# Patient Record
Sex: MALE | Race: BLACK OR AFRICAN AMERICAN | Employment: OTHER | URBAN - METROPOLITAN AREA
[De-identification: names, ages, dates, MRNs, and addresses within clinical notes are randomized per-mention and may not be internally consistent; named-entity substitution may affect disease eponyms.]

---

## 2018-04-04 ENCOUNTER — OFFICE VISIT (OUTPATIENT)
Dept: FAMILY MEDICINE CLINIC | Age: 58
End: 2018-04-04
Payer: COMMERCIAL

## 2018-04-04 VITALS
HEIGHT: 68 IN | HEART RATE: 64 BPM | DIASTOLIC BLOOD PRESSURE: 80 MMHG | TEMPERATURE: 98.4 F | BODY MASS INDEX: 28.49 KG/M2 | RESPIRATION RATE: 18 BRPM | WEIGHT: 188 LBS | OXYGEN SATURATION: 98 % | SYSTOLIC BLOOD PRESSURE: 136 MMHG

## 2018-04-04 DIAGNOSIS — I48.91 ATRIAL FIBRILLATION, UNSPECIFIED TYPE (HCC): ICD-10-CM

## 2018-04-04 DIAGNOSIS — E87.6 HYPOKALEMIA: ICD-10-CM

## 2018-04-04 DIAGNOSIS — I10 ESSENTIAL HYPERTENSION: Primary | ICD-10-CM

## 2018-04-04 PROCEDURE — 99212 OFFICE O/P EST SF 10 MIN: CPT | Performed by: FAMILY MEDICINE

## 2018-04-04 PROCEDURE — 99213 OFFICE O/P EST LOW 20 MIN: CPT | Performed by: FAMILY MEDICINE

## 2018-04-04 RX ORDER — HYDROCHLOROTHIAZIDE 12.5 MG/1
TABLET ORAL
Qty: 30 TABLET | Refills: 0 | Status: SHIPPED | OUTPATIENT
Start: 2018-04-04 | End: 2018-05-31 | Stop reason: SDUPTHER

## 2018-04-05 ASSESSMENT — ENCOUNTER SYMPTOMS
ABDOMINAL PAIN: 0
NAUSEA: 0
SHORTNESS OF BREATH: 0
COUGH: 0
VOMITING: 0
CONSTIPATION: 0
DIARRHEA: 0

## 2018-05-02 ENCOUNTER — HOSPITAL ENCOUNTER (OUTPATIENT)
Age: 58
Discharge: HOME | End: 2018-05-02
Payer: COMMERCIAL

## 2018-05-02 DIAGNOSIS — I10 ESSENTIAL HYPERTENSION: ICD-10-CM

## 2018-05-02 LAB
ANION GAP SERPL CALCULATED.3IONS-SCNC: 13 MMOL/L (ref 7–16)
BUN BLDV-MCNC: 15 MG/DL (ref 6–20)
CALCIUM SERPL-MCNC: 8.8 MG/DL (ref 8.6–10.2)
CHLORIDE BLD-SCNC: 97 MMOL/L (ref 98–107)
CO2: 28 MMOL/L (ref 22–29)
CREAT SERPL-MCNC: 1 MG/DL (ref 0.7–1.2)
GFR AFRICAN AMERICAN: >60
GFR NON-AFRICAN AMERICAN: >60 ML/MIN/1.73
GLUCOSE BLD-MCNC: 105 MG/DL (ref 74–109)
POTASSIUM SERPL-SCNC: 4.1 MMOL/L (ref 3.5–5)
SODIUM BLD-SCNC: 138 MMOL/L (ref 132–146)

## 2018-05-02 PROCEDURE — 36415 COLL VENOUS BLD VENIPUNCTURE: CPT

## 2018-05-02 PROCEDURE — 80048 BASIC METABOLIC PNL TOTAL CA: CPT

## 2018-05-07 DIAGNOSIS — I48.91 ATRIAL FIBRILLATION, UNSPECIFIED TYPE (HCC): ICD-10-CM

## 2018-05-31 ENCOUNTER — OFFICE VISIT (OUTPATIENT)
Dept: FAMILY MEDICINE CLINIC | Age: 58
End: 2018-05-31
Payer: COMMERCIAL

## 2018-05-31 VITALS
TEMPERATURE: 98.1 F | WEIGHT: 189.5 LBS | HEIGHT: 67 IN | DIASTOLIC BLOOD PRESSURE: 80 MMHG | HEART RATE: 63 BPM | BODY MASS INDEX: 29.74 KG/M2 | OXYGEN SATURATION: 96 % | SYSTOLIC BLOOD PRESSURE: 130 MMHG

## 2018-05-31 DIAGNOSIS — I48.91 ATRIAL FIBRILLATION, UNSPECIFIED TYPE (HCC): ICD-10-CM

## 2018-05-31 DIAGNOSIS — I10 ESSENTIAL HYPERTENSION: ICD-10-CM

## 2018-05-31 DIAGNOSIS — E87.6 HYPOKALEMIA: ICD-10-CM

## 2018-05-31 PROCEDURE — 99213 OFFICE O/P EST LOW 20 MIN: CPT | Performed by: FAMILY MEDICINE

## 2018-05-31 PROCEDURE — 99212 OFFICE O/P EST SF 10 MIN: CPT | Performed by: FAMILY MEDICINE

## 2018-05-31 RX ORDER — VERAPAMIL HYDROCHLORIDE 240 MG/1
TABLET, FILM COATED, EXTENDED RELEASE ORAL
Qty: 90 TABLET | Refills: 1 | Status: SHIPPED | OUTPATIENT
Start: 2018-05-31 | End: 2018-08-17 | Stop reason: SDUPTHER

## 2018-05-31 RX ORDER — POTASSIUM CHLORIDE 20 MEQ/1
40 TABLET, EXTENDED RELEASE ORAL DAILY
Qty: 60 TABLET | Refills: 3 | Status: ON HOLD | OUTPATIENT
Start: 2018-05-31 | End: 2019-02-08 | Stop reason: SDUPTHER

## 2018-05-31 RX ORDER — HYDROCHLOROTHIAZIDE 25 MG/1
TABLET ORAL
Qty: 90 TABLET | Refills: 1 | Status: ON HOLD | OUTPATIENT
Start: 2018-05-31 | End: 2018-06-28 | Stop reason: HOSPADM

## 2018-06-04 ASSESSMENT — ENCOUNTER SYMPTOMS
NAUSEA: 0
DIARRHEA: 0
SHORTNESS OF BREATH: 0
CONSTIPATION: 0
COUGH: 0
VOMITING: 0
ABDOMINAL PAIN: 0

## 2018-06-26 ENCOUNTER — HOSPITAL ENCOUNTER (OUTPATIENT)
Age: 58
Setting detail: OBSERVATION
Discharge: HOME | End: 2018-06-28
Attending: EMERGENCY MEDICINE | Admitting: FAMILY MEDICINE
Payer: COMMERCIAL

## 2018-06-26 ENCOUNTER — APPOINTMENT (OUTPATIENT)
Dept: GENERAL RADIOLOGY | Age: 58
End: 2018-06-26
Payer: COMMERCIAL

## 2018-06-26 DIAGNOSIS — I30.0 ACUTE IDIOPATHIC PERICARDITIS: ICD-10-CM

## 2018-06-26 DIAGNOSIS — E87.6 HYPOKALEMIA: ICD-10-CM

## 2018-06-26 DIAGNOSIS — I10 ESSENTIAL HYPERTENSION: ICD-10-CM

## 2018-06-26 DIAGNOSIS — I20.8 STABLE ANGINA PECTORIS (HCC): Primary | ICD-10-CM

## 2018-06-26 DIAGNOSIS — N17.9 AKI (ACUTE KIDNEY INJURY) (HCC): ICD-10-CM

## 2018-06-26 PROBLEM — R07.9 CHEST PAIN: Status: ACTIVE | Noted: 2018-06-26

## 2018-06-26 LAB
ALBUMIN SERPL-MCNC: 4.2 G/DL (ref 3.5–5.2)
ALP BLD-CCNC: 60 U/L (ref 40–129)
ALT SERPL-CCNC: 15 U/L (ref 0–40)
ANION GAP SERPL CALCULATED.3IONS-SCNC: 15 MMOL/L (ref 7–16)
AST SERPL-CCNC: 17 U/L (ref 0–39)
BILIRUB SERPL-MCNC: 1.2 MG/DL (ref 0–1.2)
BUN BLDV-MCNC: 19 MG/DL (ref 6–20)
CALCIUM SERPL-MCNC: 9.1 MG/DL (ref 8.6–10.2)
CHLORIDE BLD-SCNC: 94 MMOL/L (ref 98–107)
CK MB: 1.2 NG/ML (ref 0–7.7)
CO2: 26 MMOL/L (ref 22–29)
CREAT SERPL-MCNC: 1 MG/DL (ref 0.7–1.2)
GFR AFRICAN AMERICAN: >60
GFR NON-AFRICAN AMERICAN: >60 ML/MIN/1.73
GLUCOSE BLD-MCNC: 109 MG/DL (ref 74–109)
HCT VFR BLD CALC: 37.3 % (ref 37–54)
HEMOGLOBIN: 13.9 G/DL (ref 12.5–16.5)
MCH RBC QN AUTO: 30.9 PG (ref 26–35)
MCHC RBC AUTO-ENTMCNC: 37.3 % (ref 32–34.5)
MCV RBC AUTO: 82.9 FL (ref 80–99.9)
PDW BLD-RTO: 12.9 FL (ref 11.5–15)
PLATELET # BLD: 173 E9/L (ref 130–450)
PMV BLD AUTO: 11.8 FL (ref 7–12)
POTASSIUM SERPL-SCNC: 3.6 MMOL/L (ref 3.5–5)
RBC # BLD: 4.5 E12/L (ref 3.8–5.8)
SODIUM BLD-SCNC: 135 MMOL/L (ref 132–146)
TOTAL PROTEIN: 7.6 G/DL (ref 6.4–8.3)
TROPONIN: <0.01 NG/ML (ref 0–0.03)
WBC # BLD: 9.4 E9/L (ref 4.5–11.5)

## 2018-06-26 PROCEDURE — 71045 X-RAY EXAM CHEST 1 VIEW: CPT

## 2018-06-26 PROCEDURE — 82553 CREATINE MB FRACTION: CPT

## 2018-06-26 PROCEDURE — 84484 ASSAY OF TROPONIN QUANT: CPT

## 2018-06-26 PROCEDURE — 6360000002 HC RX W HCPCS: Performed by: EMERGENCY MEDICINE

## 2018-06-26 PROCEDURE — G0378 HOSPITAL OBSERVATION PER HR: HCPCS

## 2018-06-26 PROCEDURE — 96374 THER/PROPH/DIAG INJ IV PUSH: CPT

## 2018-06-26 PROCEDURE — 80053 COMPREHEN METABOLIC PANEL: CPT

## 2018-06-26 PROCEDURE — 86140 C-REACTIVE PROTEIN: CPT

## 2018-06-26 PROCEDURE — 36415 COLL VENOUS BLD VENIPUNCTURE: CPT

## 2018-06-26 PROCEDURE — 6370000000 HC RX 637 (ALT 250 FOR IP)

## 2018-06-26 PROCEDURE — 85027 COMPLETE CBC AUTOMATED: CPT

## 2018-06-26 PROCEDURE — 99285 EMERGENCY DEPT VISIT HI MDM: CPT

## 2018-06-26 PROCEDURE — 6370000000 HC RX 637 (ALT 250 FOR IP): Performed by: EMERGENCY MEDICINE

## 2018-06-26 RX ORDER — ASPIRIN 81 MG/1
TABLET, CHEWABLE ORAL
Status: COMPLETED
Start: 2018-06-26 | End: 2018-06-26

## 2018-06-26 RX ORDER — ASPIRIN 81 MG/1
162 TABLET, CHEWABLE ORAL ONCE
Status: COMPLETED | OUTPATIENT
Start: 2018-06-26 | End: 2018-06-26

## 2018-06-26 RX ORDER — KETOROLAC TROMETHAMINE 30 MG/ML
30 INJECTION, SOLUTION INTRAMUSCULAR; INTRAVENOUS ONCE
Status: DISCONTINUED | OUTPATIENT
Start: 2018-06-26 | End: 2018-06-26

## 2018-06-26 RX ORDER — MORPHINE SULFATE 2 MG/ML
2 INJECTION, SOLUTION INTRAMUSCULAR; INTRAVENOUS ONCE
Status: COMPLETED | OUTPATIENT
Start: 2018-06-26 | End: 2018-06-26

## 2018-06-26 RX ADMIN — NITROGLYCERIN 0.5 INCH: 20 OINTMENT TOPICAL at 20:57

## 2018-06-26 RX ADMIN — MORPHINE SULFATE 2 MG: 2 INJECTION, SOLUTION INTRAMUSCULAR; INTRAVENOUS at 20:29

## 2018-06-26 RX ADMIN — ASPIRIN 162 MG: 81 TABLET, CHEWABLE ORAL at 20:20

## 2018-06-26 RX ADMIN — ASPIRIN 81 MG 162 MG: 81 TABLET ORAL at 20:20

## 2018-06-26 ASSESSMENT — PAIN DESCRIPTION - DESCRIPTORS
DESCRIPTORS: PRESSURE
DESCRIPTORS: POUNDING

## 2018-06-26 ASSESSMENT — PAIN DESCRIPTION - PROGRESSION
CLINICAL_PROGRESSION: GRADUALLY WORSENING
CLINICAL_PROGRESSION: GRADUALLY IMPROVING

## 2018-06-26 ASSESSMENT — PAIN DESCRIPTION - FREQUENCY
FREQUENCY: CONTINUOUS
FREQUENCY: CONTINUOUS

## 2018-06-26 ASSESSMENT — PAIN SCALES - GENERAL
PAINLEVEL_OUTOF10: 7
PAINLEVEL_OUTOF10: 5

## 2018-06-26 ASSESSMENT — PAIN DESCRIPTION - ONSET: ONSET: SUDDEN

## 2018-06-26 ASSESSMENT — PAIN DESCRIPTION - PAIN TYPE
TYPE: ACUTE PAIN
TYPE: ACUTE PAIN

## 2018-06-26 ASSESSMENT — PAIN DESCRIPTION - LOCATION
LOCATION: CHEST
LOCATION: CHEST

## 2018-06-26 ASSESSMENT — PAIN DESCRIPTION - ORIENTATION: ORIENTATION: MID

## 2018-06-27 ENCOUNTER — APPOINTMENT (OUTPATIENT)
Dept: CT IMAGING | Age: 58
End: 2018-06-27
Payer: COMMERCIAL

## 2018-06-27 ENCOUNTER — HOSPITAL ENCOUNTER (OUTPATIENT)
Age: 58
Discharge: HOME | End: 2018-06-27
Payer: COMMERCIAL

## 2018-06-27 LAB
ANION GAP SERPL CALCULATED.3IONS-SCNC: 17 MMOL/L (ref 7–16)
BUN BLDV-MCNC: 18 MG/DL (ref 6–20)
C-REACTIVE PROTEIN: 14.5 MG/DL (ref 0–0.4)
CALCIUM SERPL-MCNC: 8.4 MG/DL (ref 8.6–10.2)
CHLORIDE BLD-SCNC: 94 MMOL/L (ref 98–107)
CK MB: 1.4 NG/ML (ref 0–7.7)
CK MB: <1 NG/ML (ref 0–7.7)
CO2: 23 MMOL/L (ref 22–29)
CREAT SERPL-MCNC: 0.9 MG/DL (ref 0.7–1.2)
D DIMER: 765 NG/ML DDU
GFR AFRICAN AMERICAN: >60
GFR NON-AFRICAN AMERICAN: >60 ML/MIN/1.73
GLUCOSE BLD-MCNC: 129 MG/DL (ref 74–109)
LV EF: 65 %
LVEF MODALITY: NORMAL
POTASSIUM REFLEX MAGNESIUM: 3.9 MMOL/L (ref 3.5–5)
SODIUM BLD-SCNC: 134 MMOL/L (ref 132–146)
TOTAL CK: 123 U/L (ref 20–200)
TOTAL CK: 147 U/L (ref 20–200)
TOTAL CK: 147 U/L (ref 20–200)
TROPONIN: <0.01 NG/ML (ref 0–0.03)
TROPONIN: <0.01 NG/ML (ref 0–0.03)

## 2018-06-27 PROCEDURE — 80048 BASIC METABOLIC PNL TOTAL CA: CPT

## 2018-06-27 PROCEDURE — 93306 TTE W/DOPPLER COMPLETE: CPT

## 2018-06-27 PROCEDURE — A0425 GROUND MILEAGE: HCPCS

## 2018-06-27 PROCEDURE — 82550 ASSAY OF CK (CPK): CPT

## 2018-06-27 PROCEDURE — 6370000000 HC RX 637 (ALT 250 FOR IP): Performed by: FAMILY MEDICINE

## 2018-06-27 PROCEDURE — A0426 ALS 1: HCPCS

## 2018-06-27 PROCEDURE — 99255 IP/OBS CONSLTJ NEW/EST HI 80: CPT | Performed by: INTERNAL MEDICINE

## 2018-06-27 PROCEDURE — 82553 CREATINE MB FRACTION: CPT

## 2018-06-27 PROCEDURE — G0378 HOSPITAL OBSERVATION PER HR: HCPCS

## 2018-06-27 PROCEDURE — APPSS60 APP SPLIT SHARED TIME 46-60 MINUTES: Performed by: NURSE PRACTITIONER

## 2018-06-27 PROCEDURE — 99219 PR INITIAL OBSERVATION CARE/DAY 50 MINUTES: CPT | Performed by: FAMILY MEDICINE

## 2018-06-27 PROCEDURE — 71275 CT ANGIOGRAPHY CHEST: CPT

## 2018-06-27 PROCEDURE — 36415 COLL VENOUS BLD VENIPUNCTURE: CPT

## 2018-06-27 PROCEDURE — 93005 ELECTROCARDIOGRAM TRACING: CPT | Performed by: FAMILY MEDICINE

## 2018-06-27 PROCEDURE — 2580000003 HC RX 258: Performed by: FAMILY MEDICINE

## 2018-06-27 PROCEDURE — 84484 ASSAY OF TROPONIN QUANT: CPT

## 2018-06-27 PROCEDURE — 6370000000 HC RX 637 (ALT 250 FOR IP): Performed by: INTERNAL MEDICINE

## 2018-06-27 PROCEDURE — 6360000004 HC RX CONTRAST MEDICATION: Performed by: RADIOLOGY

## 2018-06-27 PROCEDURE — 85378 FIBRIN DEGRADE SEMIQUANT: CPT

## 2018-06-27 RX ORDER — POTASSIUM CHLORIDE 750 MG/1
10 CAPSULE, EXTENDED RELEASE ORAL DAILY
COMMUNITY
End: 2018-11-06 | Stop reason: SDUPTHER

## 2018-06-27 RX ORDER — ACETAMINOPHEN 325 MG/1
650 TABLET ORAL EVERY 4 HOURS PRN
Status: DISCONTINUED | OUTPATIENT
Start: 2018-06-27 | End: 2018-06-28 | Stop reason: HOSPADM

## 2018-06-27 RX ORDER — IBUPROFEN 400 MG/1
400 TABLET ORAL 3 TIMES DAILY
Status: DISCONTINUED | OUTPATIENT
Start: 2018-06-27 | End: 2018-06-28

## 2018-06-27 RX ORDER — VERAPAMIL HYDROCHLORIDE 240 MG/1
240 TABLET, FILM COATED, EXTENDED RELEASE ORAL NIGHTLY
Status: DISCONTINUED | OUTPATIENT
Start: 2018-06-27 | End: 2018-06-27

## 2018-06-27 RX ORDER — POTASSIUM CHLORIDE 20 MEQ/1
20 TABLET, EXTENDED RELEASE ORAL DAILY
Status: DISCONTINUED | OUTPATIENT
Start: 2018-06-27 | End: 2018-06-28

## 2018-06-27 RX ORDER — IBUPROFEN 400 MG/1
400 TABLET ORAL EVERY 8 HOURS PRN
Status: DISCONTINUED | OUTPATIENT
Start: 2018-06-27 | End: 2018-06-27

## 2018-06-27 RX ORDER — VERAPAMIL HYDROCHLORIDE 240 MG/1
240 TABLET, FILM COATED, EXTENDED RELEASE ORAL DAILY
Status: DISCONTINUED | OUTPATIENT
Start: 2018-06-28 | End: 2018-06-28 | Stop reason: HOSPADM

## 2018-06-27 RX ORDER — COLCHICINE 0.6 MG/1
0.6 TABLET ORAL 2 TIMES DAILY
Status: DISCONTINUED | OUTPATIENT
Start: 2018-06-27 | End: 2018-06-28 | Stop reason: HOSPADM

## 2018-06-27 RX ORDER — SODIUM CHLORIDE 0.9 % (FLUSH) 0.9 %
10 SYRINGE (ML) INJECTION PRN
Status: DISCONTINUED | OUTPATIENT
Start: 2018-06-27 | End: 2018-06-28 | Stop reason: HOSPADM

## 2018-06-27 RX ORDER — SODIUM CHLORIDE 0.9 % (FLUSH) 0.9 %
10 SYRINGE (ML) INJECTION EVERY 12 HOURS SCHEDULED
Status: DISCONTINUED | OUTPATIENT
Start: 2018-06-27 | End: 2018-06-28 | Stop reason: HOSPADM

## 2018-06-27 RX ORDER — HYDROCHLOROTHIAZIDE 25 MG/1
25 TABLET ORAL DAILY
Status: DISCONTINUED | OUTPATIENT
Start: 2018-06-27 | End: 2018-06-27

## 2018-06-27 RX ORDER — NAPROXEN 500 MG/1
500 TABLET ORAL 2 TIMES DAILY WITH MEALS
Status: DISCONTINUED | OUTPATIENT
Start: 2018-06-27 | End: 2018-06-27

## 2018-06-27 RX ADMIN — IOPAMIDOL 75 ML: 755 INJECTION, SOLUTION INTRAVENOUS at 10:06

## 2018-06-27 RX ADMIN — APIXABAN 5 MG: 5 TABLET, FILM COATED ORAL at 10:34

## 2018-06-27 RX ADMIN — COLCHICINE 0.6 MG: 0.6 TABLET, FILM COATED ORAL at 14:51

## 2018-06-27 RX ADMIN — Medication 10 ML: at 10:35

## 2018-06-27 RX ADMIN — ACETAMINOPHEN 650 MG: 325 TABLET, FILM COATED ORAL at 11:58

## 2018-06-27 RX ADMIN — IBUPROFEN 400 MG: 400 TABLET ORAL at 20:42

## 2018-06-27 RX ADMIN — APIXABAN 5 MG: 5 TABLET, FILM COATED ORAL at 20:42

## 2018-06-27 RX ADMIN — ACETAMINOPHEN 650 MG: 325 TABLET, FILM COATED ORAL at 02:54

## 2018-06-27 RX ADMIN — POTASSIUM CHLORIDE 20 MEQ: 20 TABLET, EXTENDED RELEASE ORAL at 10:34

## 2018-06-27 RX ADMIN — IBUPROFEN 400 MG: 400 TABLET ORAL at 14:52

## 2018-06-27 RX ADMIN — Medication 10 ML: at 20:42

## 2018-06-27 RX ADMIN — VERAPAMIL HYDROCHLORIDE 240 MG: 240 TABLET, FILM COATED, EXTENDED RELEASE ORAL at 10:39

## 2018-06-27 RX ADMIN — HYDROCHLOROTHIAZIDE 25 MG: 25 TABLET ORAL at 10:34

## 2018-06-27 RX ADMIN — COLCHICINE 0.6 MG: 0.6 TABLET, FILM COATED ORAL at 20:42

## 2018-06-27 ASSESSMENT — PAIN SCALES - GENERAL
PAINLEVEL_OUTOF10: 2
PAINLEVEL_OUTOF10: 5
PAINLEVEL_OUTOF10: 3
PAINLEVEL_OUTOF10: 2
PAINLEVEL_OUTOF10: 0
PAINLEVEL_OUTOF10: 5
PAINLEVEL_OUTOF10: 3

## 2018-06-27 ASSESSMENT — PAIN DESCRIPTION - DESCRIPTORS
DESCRIPTORS: ACHING
DESCRIPTORS: ACHING

## 2018-06-27 ASSESSMENT — PAIN DESCRIPTION - LOCATION
LOCATION: CHEST

## 2018-06-27 ASSESSMENT — PAIN DESCRIPTION - ORIENTATION: ORIENTATION: MID

## 2018-06-27 ASSESSMENT — PAIN DESCRIPTION - PAIN TYPE
TYPE: ACUTE PAIN

## 2018-06-27 NOTE — CONSULTS
Inpatient Cardiology Consultation      Reason for Consult:  Acute Pericarditis    Consulting Physician: Dr. Nito Gallagher    Requesting Physician:  Dr. Marlyn Mixon    Date of Consultation: 6/27/2018    HISTORY OF PRESENT ILLNESS: Mr. Noelle Magallanes is a 66year old AA male who is previously known to Dr. Winsome Sullivan; most recently seen in 10/26/2017 for history of HTN and PAF on chronic Eliquis therapy. No medication changes were made during office visit. Mr. Noelle Magallanes presented to [facility] on 6/26/2018 with complaints of mid-sternal \"throbbing/pulsating pain\" that started on 6/24/2018 at ~8:00 PM. Patient stated that he has been in his normal state of health other than constipation. He reported that on 6/25/2018 he was at his part-time job cleaning an office ~ 3 hours and when he went home he was sitting down with a sudden onset of mid-sternal \"thrombing\" pain that he reported as \"I feel like my chest was pulsating. \" He denies associated symptoms. The discomfort was made worse with laying flat and taking in a deep breath but was not worsened with exertion or sitting forward. He was able to fall asleep but when he woke up on 6/26/2018 he had constant mid-sternal throbbing pain until now on 6/27/2018. He did have some relief with taking  mg lessoning his pain from 8/10 to 5/10. Denies having this pain in the past. Denies LE edema, weight gain, fever, chills, night sweats, and palpitations. Upon arrival to the ED: Labs included troponin <0.01 x 2, no rise in CK or CKMB. CRP 14.5, BUN/Cr 19/1.0, WBC 9.4, H/H stable. Ddimer 765. CTA chest: pending. CXR: cardiomegaly, atherosclerotic disease of the aorta. EKG showed ST elevation in V6, I, II, and aVl (with possible acute pericarditis), rate 71. Medications given in the ED:  mg, Morphine IV, and Nitroglycerin paste 0.5 inch. He was admitted to telemetry monitored unit. Cardiology was consulted. ECHO was ordered STAT showing small-moderate sized pericardial effusion. Telemetry monitor shows SR and he converted to Afib with RVR at 10:04 on 6/27/2018. Denies CP and SOB. VSS. Please note: past medical records were reviewed per electronic medical record (EMR) - see detailed reports under Past Medical/ Surgical History. Past Medical History:    · TTE: October 2017 was essentially normal except for mild concentric LVH. · Treadmill  EKG 10/26/17 normal  · Small to Mod sized pericardial effusion (6/27/2018):  · ECHO: 6/27/2018: (Dr. Jt Aguilar): Normal left ventricular systolic function, EF 50%, Mild LVH, Mild TR, PASP is estimated at 16 mmHg, Small-moderate sized circumferential pericardial effusion (right ventricular inversion in early diastole). No definitive tamponade physiology based on TV/MV inflow velocities. · Exercise stress test: 10/2017: Kal protocol, completing 7:32 minutes and reaching an estimated work load of 71.3 metabolic equivalents (METS). Resting HR was 55. Peak  exercise heart rate was 140 ( 85% of maximum predicted heart rate  for age). Baseline /80. Peak exercise /84. EKG negative. · HTN  · PAF: [id number] score: at least 1 (HTN). · OA  · Sinusitis   · Bilateral knee arthroscopy   · Lifetime nonsmoker   · HLD: Diet management. Denies being on statin therapy in the remote past.       Medications Prior to admit:  Prior to Admission medications    Medication Sig Start Date End Date Taking?  Authorizing Provider   potassium chloride (MICRO-K) 10 MEQ extended release capsule Take 10 mEq by mouth daily IN EVENING   Yes Historical Provider, MD   apixaban (ELIQUIS) 5 MG TABS tablet Take 1 tablet by mouth 2 times daily 5/31/18  Yes Juliette June,    potassium chloride (KLOR-CON M) 20 MEQ extended release tablet Take 2 tablets by mouth daily  Patient taking differently: Take 20 mEq by mouth daily IN AM 5/31/18  Yes Rigo Sauceda, DO   hydrochlorothiazide (HYDRODIURIL) 25 MG tablet TAKE 1 TABLET BY MOUTH ONCE EVERY DAY 5/31/18  Yes Alena Calles

## 2018-06-27 NOTE — ED PROVIDER NOTES
82.9 80.0 - 99.9 fL    MCH 30.9 26.0 - 35.0 pg    MCHC 37.3 (H) 32.0 - 34.5 %    RDW 12.9 11.5 - 15.0 fL    Platelets 449 946 - 159 E9/L    MPV 11.8 7.0 - 12.0 fL   Comprehensive Metabolic Panel   Result Value Ref Range    Sodium 135 132 - 146 mmol/L    Potassium 3.6 3.5 - 5.0 mmol/L    Chloride 94 (L) 98 - 107 mmol/L    CO2 26 22 - 29 mmol/L    Anion Gap 15 7 - 16 mmol/L    Glucose 109 74 - 109 mg/dL    BUN 19 6 - 20 mg/dL    CREATININE 1.0 0.7 - 1.2 mg/dL    GFR Non-African American >60 >=60 mL/min/1.73    GFR African American >60     Calcium 9.1 8.6 - 10.2 mg/dL    Total Protein 7.6 6.4 - 8.3 g/dL    Alb 4.2 3.5 - 5.2 g/dL    Total Bilirubin 1.2 0.0 - 1.2 mg/dL    Alkaline Phosphatase 60 40 - 129 U/L    ALT 15 0 - 40 U/L    AST 17 0 - 39 U/L   Troponin   Result Value Ref Range    Troponin <0.01 0.00 - 0.03 ng/mL   CK MB   Result Value Ref Range    CK-MB 1.2 0.0 - 7.7 ng/mL       RADIOLOGY:  Interpreted by Radiologist.  XR CHEST PORTABLE   Final Result   Cardiomegaly   Findings compatible with atherosclerotic disease of the aorta. EKG Interpretation  Interpreted by emergency department physicianTico  Time: 2005  Rhythm:NSR  Rate: normal  Axis: normal  Conduction: normal  ST Segments: elevation in  v6, I, II and aVl  T Waves: non specific changes  Clinical Impression: myocardial injury  Comparison to prior EKG: stable as compared to patient's most recent EKG. The cardiologist felt that the patient had pericarditis.      ------------------------- NURSING NOTES AND VITALS REVIEWED ---------------------------   The nursing notes within the ED encounter and vital signs as below have been reviewed by myself. /70   Pulse 71   Temp 99.3 °F (37.4 °C) (Oral)   Resp 18   Ht 5' 7\" (1.702 m)   Wt 180 lb (81.6 kg)   SpO2 96%   BMI 28.19 kg/m²   Oxygen Saturation Interpretation: Normal    The patients available past medical records and past encounters were reviewed.

## 2018-06-27 NOTE — H&P
Start Date End Date Taking? Authorizing Provider   potassium chloride (MICRO-K) 10 MEQ extended release capsule Take 10 mEq by mouth daily IN EVENING   Yes Historical Provider, MD   apixaban (ELIQUIS) 5 MG TABS tablet Take 1 tablet by mouth 2 times daily 5/31/18  Yes Rigo Sauceda DO   potassium chloride (KLOR-CON M) 20 MEQ extended release tablet Take 2 tablets by mouth daily  Patient taking differently: Take 20 mEq by mouth daily IN AM 5/31/18  Yes Rigo Sauceda DO   hydrochlorothiazide (HYDRODIURIL) 25 MG tablet TAKE 1 TABLET BY MOUTH ONCE EVERY DAY 5/31/18  Yes Rigo Sauceda DO   verapamil (CALAN SR) 240 MG extended release tablet TAKE ONE TABLET BY MOUTH AT BEDTIME 5/31/18  Yes Rigo Sauceda DO       Allergies:  Patient has no known allergies. Family History:   Family History   Problem Relation Age of Onset    High Blood Pressure Mother     High Blood Pressure Father     No Known Problems Sister     No Known Problems Brother        Social History:   TOBACCO:   reports that he has never smoked. He has never used smokeless tobacco.  ETOH:   reports that he does not drink alcohol. OCCUPATION:      REVIEW OF SYSTEMS:  · Constitutional: No fever, no chill or change in weight; good appetite  · HEENT: No blurred vision, no ear problems, no sore throat, no running nose. · Respiratory: No cough, no sputum, no pleuritic chest pain, no shortness of breath  · Cardiology: No angina, no dyspnea on exertion, no paroxysmal nocturnal dyspnea, no orthopnea, no palpitation, no leg swelling. · Gastroenterology: Positive for constipation. No dysphagia, no reflux; no abdominal pain, no nausea or vomiting; no diarrhea. No blood in stool. · Genitourinary: No dysuria, no frequency, hesitancy; no hematuria  · Musculoskeletal: no joint pain, no myalgia, no change in range of movement  · Neurology: no focal weakness in extremities, no slurred speech, no double vision, no tingling or numbness sensation. E9/L    RBC 4.50 3.80 - 5.80 E12/L    Hemoglobin 13.9 12.5 - 16.5 g/dL    Hematocrit 37.3 37.0 - 54.0 %    MCV 82.9 80.0 - 99.9 fL    MCH 30.9 26.0 - 35.0 pg    MCHC 37.3 (H) 32.0 - 34.5 %    RDW 12.9 11.5 - 15.0 fL    Platelets 173 809 - 958 E9/L    MPV 11.8 7.0 - 12.0 fL   Comprehensive Metabolic Panel   Result Value Ref Range    Sodium 135 132 - 146 mmol/L    Potassium 3.6 3.5 - 5.0 mmol/L    Chloride 94 (L) 98 - 107 mmol/L    CO2 26 22 - 29 mmol/L    Anion Gap 15 7 - 16 mmol/L    Glucose 109 74 - 109 mg/dL    BUN 19 6 - 20 mg/dL    CREATININE 1.0 0.7 - 1.2 mg/dL    GFR Non-African American >60 >=60 mL/min/1.73    GFR African American >60     Calcium 9.1 8.6 - 10.2 mg/dL    Total Protein 7.6 6.4 - 8.3 g/dL    Alb 4.2 3.5 - 5.2 g/dL    Total Bilirubin 1.2 0.0 - 1.2 mg/dL    Alkaline Phosphatase 60 40 - 129 U/L    ALT 15 0 - 40 U/L    AST 17 0 - 39 U/L   Troponin   Result Value Ref Range    Troponin <0.01 0.00 - 0.03 ng/mL   CK MB   Result Value Ref Range    CK-MB 1.2 0.0 - 7.7 ng/mL       Imaging Studies:  Radiology:   XR CHEST PORTABLE   Final Result   Cardiomegaly   Findings compatible with atherosclerotic disease of the aorta. EKG: Rhythm Strip: NSR, LAE, J point elevation in I, aVl, II and V4-V6. Resident's Assessment and PLan     Atypical chest pain  Constant chest pain ×3 days, worse with deep breathing and lying flat  No cough, shortness of breath, heartburn, URI-like symptoms. Mild tenderness to palpation of the anterior chest wall  EKG thought to reflect acute pericarditis per cardio. HEART score of 3  Chest x-ray normal.  Admit to telemetry, Repeat EKG in a.m., cycle cardiac enzymes, check d-dimer( well's score of 0-1)  Exercise stress test in 10/17 normal.  Echo in 2017 revealed concentric left ventricular hypertrophy.   Cardiology consult for opinion, follows with Dr. Elidia Vivar for pain control  Oxygen support if needed    A. fib, rate controlled, anticoagulated on Eliquis  Rate 70s, continue home dose of verapamil and Eliquis    Hypertension  Stable, resume home meds    DVT/GI prophylaxis-already on Olive Ríos M.D., PGY2    Attending physician: Dr. Olvin Wang

## 2018-06-27 NOTE — ED NOTES
Mobile here for patient transport. Patient to [facility] via mobile.       Da Bunn RN  06/27/18 0816

## 2018-06-28 VITALS
DIASTOLIC BLOOD PRESSURE: 60 MMHG | WEIGHT: 185 LBS | SYSTOLIC BLOOD PRESSURE: 112 MMHG | OXYGEN SATURATION: 92 % | BODY MASS INDEX: 29.03 KG/M2 | HEART RATE: 82 BPM | TEMPERATURE: 99 F | HEIGHT: 67 IN | RESPIRATION RATE: 16 BRPM

## 2018-06-28 PROBLEM — R07.81 PLEURITIC CHEST PAIN: Status: ACTIVE | Noted: 2018-06-26

## 2018-06-28 PROBLEM — I30.9 PERICARDITIS, ACUTE: Status: ACTIVE | Noted: 2018-06-28

## 2018-06-28 LAB
ANION GAP SERPL CALCULATED.3IONS-SCNC: 13 MMOL/L (ref 7–16)
BUN BLDV-MCNC: 31 MG/DL (ref 6–20)
CALCIUM SERPL-MCNC: 8.4 MG/DL (ref 8.6–10.2)
CHLORIDE BLD-SCNC: 93 MMOL/L (ref 98–107)
CO2: 27 MMOL/L (ref 22–29)
CREAT SERPL-MCNC: 1.2 MG/DL (ref 0.7–1.2)
GFR AFRICAN AMERICAN: >60
GFR NON-AFRICAN AMERICAN: >60 ML/MIN/1.73
GLUCOSE BLD-MCNC: 112 MG/DL (ref 74–109)
POTASSIUM REFLEX MAGNESIUM: 3.8 MMOL/L (ref 3.5–5)
SODIUM BLD-SCNC: 133 MMOL/L (ref 132–146)

## 2018-06-28 PROCEDURE — 6370000000 HC RX 637 (ALT 250 FOR IP): Performed by: INTERNAL MEDICINE

## 2018-06-28 PROCEDURE — G0378 HOSPITAL OBSERVATION PER HR: HCPCS

## 2018-06-28 PROCEDURE — 6370000000 HC RX 637 (ALT 250 FOR IP): Performed by: FAMILY MEDICINE

## 2018-06-28 PROCEDURE — 6370000000 HC RX 637 (ALT 250 FOR IP): Performed by: NURSE PRACTITIONER

## 2018-06-28 PROCEDURE — 80048 BASIC METABOLIC PNL TOTAL CA: CPT

## 2018-06-28 PROCEDURE — 36415 COLL VENOUS BLD VENIPUNCTURE: CPT

## 2018-06-28 PROCEDURE — 99239 HOSP IP/OBS DSCHRG MGMT >30: CPT | Performed by: FAMILY MEDICINE

## 2018-06-28 PROCEDURE — 99255 IP/OBS CONSLTJ NEW/EST HI 80: CPT | Performed by: INTERNAL MEDICINE

## 2018-06-28 PROCEDURE — 2580000003 HC RX 258: Performed by: FAMILY MEDICINE

## 2018-06-28 RX ORDER — COLCHICINE 0.6 MG/1
0.6 TABLET ORAL 2 TIMES DAILY
Qty: 30 TABLET | Refills: 3 | Status: SHIPPED | OUTPATIENT
Start: 2018-06-28 | End: 2018-06-28

## 2018-06-28 RX ORDER — COLCHICINE 0.6 MG/1
0.6 TABLET ORAL 2 TIMES DAILY
Qty: 30 TABLET | Refills: 2 | Status: SHIPPED | OUTPATIENT
Start: 2018-06-28 | End: 2018-08-02 | Stop reason: SDUPTHER

## 2018-06-28 RX ORDER — NAPROXEN 500 MG/1
500 TABLET ORAL 2 TIMES DAILY WITH MEALS
Status: DISCONTINUED | OUTPATIENT
Start: 2018-06-28 | End: 2018-06-28 | Stop reason: HOSPADM

## 2018-06-28 RX ORDER — NAPROXEN 500 MG/1
500 TABLET ORAL 2 TIMES DAILY WITH MEALS
Qty: 14 TABLET | Refills: 0 | Status: SHIPPED | OUTPATIENT
Start: 2018-06-28 | End: 2018-08-17 | Stop reason: ALTCHOICE

## 2018-06-28 RX ORDER — NAPROXEN 500 MG/1
500 TABLET ORAL 2 TIMES DAILY WITH MEALS
Qty: 60 TABLET | Refills: 3 | Status: SHIPPED | OUTPATIENT
Start: 2018-06-28 | End: 2018-06-28

## 2018-06-28 RX ORDER — POTASSIUM CHLORIDE 750 MG/1
10 TABLET, EXTENDED RELEASE ORAL ONCE
Status: COMPLETED | OUTPATIENT
Start: 2018-06-28 | End: 2018-06-28

## 2018-06-28 RX ORDER — POTASSIUM CHLORIDE 20 MEQ/1
20 TABLET, EXTENDED RELEASE ORAL DAILY
Qty: 30 TABLET | Refills: 3 | Status: SHIPPED | OUTPATIENT
Start: 2018-06-28 | End: 2018-11-06 | Stop reason: SDUPTHER

## 2018-06-28 RX ADMIN — APIXABAN 5 MG: 5 TABLET, FILM COATED ORAL at 09:30

## 2018-06-28 RX ADMIN — IBUPROFEN 400 MG: 400 TABLET ORAL at 09:30

## 2018-06-28 RX ADMIN — POTASSIUM CHLORIDE 20 MEQ: 20 TABLET, EXTENDED RELEASE ORAL at 09:31

## 2018-06-28 RX ADMIN — VERAPAMIL HYDROCHLORIDE 240 MG: 240 TABLET, FILM COATED, EXTENDED RELEASE ORAL at 09:31

## 2018-06-28 RX ADMIN — COLCHICINE 0.6 MG: 0.6 TABLET, FILM COATED ORAL at 09:30

## 2018-06-28 RX ADMIN — POTASSIUM CHLORIDE 10 MEQ: 10 TABLET, EXTENDED RELEASE ORAL at 11:31

## 2018-06-28 RX ADMIN — Medication 10 ML: at 09:32

## 2018-06-28 ASSESSMENT — PAIN SCALES - GENERAL
PAINLEVEL_OUTOF10: 0

## 2018-06-28 NOTE — PROGRESS NOTES
hydroxide (MILK OF MAGNESIA) 400 MG/5ML suspension 30 mL  30 mL Oral Daily PRN Jerome Wu MD        colchicine (COLCRYS) tablet 0.6 mg  0.6 mg Oral BID Jostin Singh MD   0.6 mg at 06/27/18 2042    verapamil (CALAN SR) extended release tablet 240 mg  240 mg Oral Daily James Alvarado MD        ibuprofen (ADVIL;MOTRIN) tablet 400 mg  400 mg Oral TID Jostin Singh MD   400 mg at 06/27/18 2042         Physical Exam:  /70   Pulse 76   Temp 98.5 °F (36.9 °C) (Oral)   Resp 16   Ht 5' 7\" (1.702 m)   Wt 185 lb (83.9 kg)   SpO2 91%   BMI 28.98 kg/m²   Wt Readings from Last 3 Encounters:   06/28/18 185 lb (83.9 kg)   05/31/18 189 lb 8 oz (86 kg)   04/04/18 188 lb (85.3 kg)     Appearance: Awake, alert, no acute respiratory distress  Skin: Intact, no rash  Head: Normocephalic, atraumatic  Eyes: EOMI, no conjunctival erythema  ENMT: No pharyngeal erythema, MMM, no rhinorrhea  Neck: Supple, no elevated JVP, no carotid bruits  Lungs: Clear to auscultation bilaterally. No wheezes, rales, or rhonchi. Cardiac: Regular rate and rhythm, +S1S2, no murmurs apparent  Abdomen: Soft, nontender, +bowel sounds  Extremities: Moves all extremities x 4, no lower extremity edema  Neurologic: No focal motor deficits apparent, normal mood and affect  Peripheral Pulses: Intact posterior tibial pulses bilaterally    Intake/Output:    Intake/Output Summary (Last 24 hours) at 06/28/18 0830  Last data filed at 06/28/18 0414   Gross per 24 hour   Intake                0 ml   Output               75 ml   Net              -75 ml     No intake/output data recorded.     Laboratory Tests:  Recent Labs      06/26/18 2025 06/27/18   0408  06/28/18   0438   NA  135  134  133   K  3.6  3.9  3.8   CL  94*  94*  93*   CO2  26  23  27   BUN  19  18  31*   CREATININE  1.0  0.9  1.2   GLUCOSE  109  129*  112*   CALCIUM  9.1  8.4*  8.4*     Lab Results   Component Value Date    MG 2.3 10/19/2017     Recent Labs      06/26/18 2025 ALKPHOS  60   ALT  15   AST  17   PROT  7.6   BILITOT  1.2   LABALBU  4.2     Recent Labs      06/26/18 2025   WBC  9.4   RBC  4.50   HGB  13.9   HCT  37.3   MCV  82.9   MCH  30.9   MCHC  37.3*   RDW  12.9   PLT  173   MPV  11.8     Lab Results   Component Value Date    CKTOTAL 147 06/27/2018    CKMB 1.4 06/27/2018    TROPONINI <0.01 06/27/2018    TROPONINI <0.01 06/27/2018    TROPONINI <0.01 06/26/2018     Lab Results   Component Value Date    INR 1.0 02/11/2018    PROTIME 12.1 02/11/2018     Lab Results   Component Value Date    TSH 2.060 10/19/2017     Lab Results   Component Value Date    LABA1C 5.0 05/26/2017     Lab Results   Component Value Date    CHOL 215 (H) 05/26/2017    CHOL 208 (H) 12/04/2015    CHOL 208 (H) 04/21/2015     Lab Results   Component Value Date    TRIG 54 05/26/2017    TRIG 50 12/04/2015    TRIG 41 04/21/2015     Lab Results   Component Value Date    HDL 76 05/26/2017    HDL 99 12/04/2015    HDL 78 04/21/2015     Lab Results   Component Value Date    LDLCALC 128 (H) 05/26/2017    LDLCALC 99 12/04/2015    LDLCALC 122 (H) 04/21/2015    LDLCHOLESTEROL 124 (H) 11/19/2013     Lab Results   Component Value Date    LABVLDL 11 05/26/2017    LABVLDL 10 12/04/2015    LABVLDL 8 04/21/2015     Cardiac Tests:  Telemetry findings reviewed: SR --> rate controlled atrial fibrillation     Exercise stress EKG: 10/26/17  1. Exercise EKG was negative. 2. The patient experienced no chest pain with exercise. 3. Beard treadmill score was 7 implying low risk.    4. Exercise capacity was average.    5. Low risk general exercise treadmill test.     CTA chest: 6/27/18  1. No evidence of pulmonary embolism.       2.  New moderate pericardial effusion.       3. Pulmonary vascular congestion with atelectatic changes at left lung   base and minimal bilateral pleural effusions.         Echocardiogram: 3/1/16 ([place])   Normal left ventricular systolic function.   Ejection fraction is visually estimated at 55-60%.  Mild concentric left ventricular hypertrophy.   Normal right ventricular size and function.   No evidence of hemodynamically significant valve disease.     Echocardiogram: 6/27/18 ([place])   Normal left ventricular systolic function.   Ejection fraction is visually estimated at 65%.  Mild concentric left ventricular hypertrophy.   Normal right ventricular size and function.   Mild tricuspid regurgitation.   PASP is estimated at 16 mmHg.   Small-moderate sized circumferential pericardial effusion (right   ventricular inversion in early diastole). No definitive tamponade   physiology based on TV/MV inflow velocities.     Impression:  1. Chest pain / pericarditis -- clinically improved, currently with no acute distress or CP at rest, CRP 14.5, negative troponin x 3  2. Pericardial effusion -- clinically with no evidence of tamponade physiology, hemodynamics stable  3. Paroxysmal atrial fibrillation (diagnosed prior to this admission) -- SR on admission, now rate controlled atrial fibrillation  4. Chronic anticoagulation with eliquis  5. HTN -- controlled  6. HLD     Plan:  1. Echocardiogram results reviewed the patient --> will perform serial echocardiograms  2. Colchicine and NSAID added 6/27/18 (plan on short course of NSAID)  3. Continue eliquis 5 mg BID  4. Remain off HCTZ / continue current medications otherwise  5.  Discharge planning    Rishi San MD  [facility]) Cardiology

## 2018-06-28 NOTE — DISCHARGE SUMMARY
Physician Discharge Summary     Patient ID:  Ruth Rubio  [id number]  36 y.o.  1960    Admit date: 6/26/2018    Discharge date and time: 6/28/18    Admitting Physician: Nyasia Stevenson MD     Discharge Physician: Dr. Daljit Riley    Admission Diagnoses: Chest pain [R07.9]  Chest pain [R07.9]    Discharge Diagnoses:   Principal Problem:    Pericarditis  Active Problems:    Hypertension    PAF (paroxysmal atrial fibrillation) (HCC)    Chest pain    Pericardial effusion    Chronic anticoagulation  Resolved Problems:    * No resolved hospital problems. *      Admission Condition: stable    Discharged Condition: good    Indication for Admission: Acute pericarditis; Chest pain    Hospital Course:   Ruth Rubio is a 66 y.o. male with a past medical history of paroxysmal atrial fibrillation on eliquis and essential hypertension who presented with the complaints of substernal chest pain ×3 days that improved with sitting up. He was found to have acute pericarditis with pericardial effusion. CTA did read the pericardial effusion as moderate in size. However, the echo revealed a small to moderate pleural effusion. Pt was placed on colchicine and naprosen. He is to continue the naprosen for 7 days and the colchicine for 3 months. He is to follow up with cardiology for serial echos. He was advised to return or call if symptoms worsen or fail to improve. Consults: cardiology    Significant Diagnostic Studies: labs, radiology and cardiac graphics    Treatments: analgesia, cardiac meds and anticoagulation    Discharge Exam:  Blood pressure 108/70, pulse 76, temperature 98.5 °F (36.9 °C), temperature source Oral, resp. rate 16, height 5' 7\" (1.702 m), weight 185 lb (83.9 kg), SpO2 91 %.     GENERAL: Alert, cooperative, no acute distress. HEENT: Normocephalic, atraumatic. Conjunctiva/corneas clear, EOM's intact, no pallor or icterus. NECK: No JVD  LUNG: Clear to auscultation bilaterally,  respirations unlabored.  No

## 2018-06-28 NOTE — PLAN OF CARE
Problem: Pain:  Goal: Pain level will decrease  Pain level will decrease   Outcome: Completed Date Met: 06/28/18    Goal: Control of acute pain  Control of acute pain   Outcome: Completed Date Met: 06/28/18    Goal: Control of chronic pain  Control of chronic pain   Outcome: Completed Date Met: 06/28/18

## 2018-06-29 ENCOUNTER — TELEPHONE (OUTPATIENT)
Dept: CARDIOLOGY CLINIC | Age: 58
End: 2018-06-29

## 2018-06-29 DIAGNOSIS — I31.9 PERICARDITIS, UNSPECIFIED CHRONICITY, UNSPECIFIED TYPE: Primary | ICD-10-CM

## 2018-06-29 LAB
EKG ATRIAL RATE: 74 BPM
EKG P AXIS: 27 DEGREES
EKG P-R INTERVAL: 142 MS
EKG Q-T INTERVAL: 372 MS
EKG QRS DURATION: 86 MS
EKG QTC CALCULATION (BAZETT): 412 MS
EKG R AXIS: 1 DEGREES
EKG T AXIS: 12 DEGREES
EKG VENTRICULAR RATE: 74 BPM

## 2018-06-29 PROCEDURE — 93010 ELECTROCARDIOGRAM REPORT: CPT | Performed by: FAMILY MEDICINE

## 2018-07-05 NOTE — TELEPHONE ENCOUNTER
Spoke with Pete Shrestha. She will see if they can squeeze patient in for echo. Patient is on the schedule to see Dr. Winsome Sullivan at 2:00 p.m.

## 2018-07-05 NOTE — TELEPHONE ENCOUNTER
No answer when trying to schedule patient as requested. .. Will keep you updated throughout the day. ........... Clemente Reyes

## 2018-07-06 ENCOUNTER — OFFICE VISIT (OUTPATIENT)
Dept: FAMILY MEDICINE CLINIC | Age: 58
End: 2018-07-06
Payer: COMMERCIAL

## 2018-07-06 VITALS
BODY MASS INDEX: 28.41 KG/M2 | HEIGHT: 67 IN | DIASTOLIC BLOOD PRESSURE: 65 MMHG | OXYGEN SATURATION: 97 % | WEIGHT: 181 LBS | SYSTOLIC BLOOD PRESSURE: 108 MMHG | HEART RATE: 100 BPM | RESPIRATION RATE: 18 BRPM | TEMPERATURE: 98.6 F

## 2018-07-06 DIAGNOSIS — I31.39 PERICARDIAL EFFUSION: ICD-10-CM

## 2018-07-06 DIAGNOSIS — I10 ESSENTIAL HYPERTENSION: ICD-10-CM

## 2018-07-06 DIAGNOSIS — I30.0 ACUTE IDIOPATHIC PERICARDITIS: Primary | ICD-10-CM

## 2018-07-06 DIAGNOSIS — I48.0 PAROXYSMAL ATRIAL FIBRILLATION (HCC): ICD-10-CM

## 2018-07-06 PROCEDURE — 99213 OFFICE O/P EST LOW 20 MIN: CPT | Performed by: STUDENT IN AN ORGANIZED HEALTH CARE EDUCATION/TRAINING PROGRAM

## 2018-07-06 PROCEDURE — 1111F DSCHRG MED/CURRENT MED MERGE: CPT | Performed by: STUDENT IN AN ORGANIZED HEALTH CARE EDUCATION/TRAINING PROGRAM

## 2018-07-06 PROCEDURE — 99212 OFFICE O/P EST SF 10 MIN: CPT | Performed by: STUDENT IN AN ORGANIZED HEALTH CARE EDUCATION/TRAINING PROGRAM

## 2018-07-06 NOTE — PROGRESS NOTES
Attending Physician Statement    S:   Chief Complaint   Patient presents with    Follow-Up from Hospital      F/u pericarditis. Slight pain still, but doing much better. Takes colchicine, naproxen. No other symptoms. O: Blood pressure 108/65, pulse 100, temperature 98.6 °F (37 °C), temperature source Oral, resp. rate 18, height 5' 7\" (1.702 m), weight 181 lb (82.1 kg), SpO2 97 %. Exam:   Heart - irregular, no murmurs   Lungs - clear   Ext - no edema  A: Pericarditis, improving  P:  Continue meds, f/u with cardiology as recommended   Follow-up as ordered    I have discussed the case, including pertinent history and exam findings with the resident. I agree with the documented assessment and plan.

## 2018-07-06 NOTE — PROGRESS NOTES
Post-Discharge Transitional Care Management Services      Magalis Bardales   YOB: 1960    Date of Office Visit:  7/6/2018  Date of Hospital Admission: 6/26/18  Date of Hospital Discharge: 6/28/18  [facility] Risk Score [risk of hospital readmission >=10  medium risk (chance of readmission ~ 12%) >14  high risk (chance of readmission ~18%)]:Readmission Risk Score: 9    Care management risk score Rising risk (score 2-5) and Complex Care (Scores >=6): 4       Patient Active Problem List   Diagnosis    Atypical chest pain    Hypertension    Hyperlipidemia    Osteoarthritis of both knees    Atrial fibrillation (HCC)    Pain in both knees    Pleuritic chest pain    Pericarditis    Pericardial effusion    Chronic anticoagulation    Pericarditis, acute       No Known Allergies    Medications listed as ordered at the time of discharge from hospital   [facility]   Home Medication Instructions [id number]    Printed on:07/06/18 1142   Medication Information                      apixaban (ELIQUIS) 5 MG TABS tablet  Take 1 tablet by mouth 2 times daily             colchicine (COLCRYS) 0.6 MG tablet  Take 1 tablet by mouth 2 times daily             naproxen (NAPROSYN) 500 MG tablet  Take 1 tablet by mouth 2 times daily (with meals) for 7 days             potassium chloride (KLOR-CON M) 20 MEQ extended release tablet  Take 1 tablet by mouth daily IN AM             potassium chloride (MICRO-K) 10 MEQ extended release capsule  Take 10 mEq by mouth daily IN EVENING             verapamil (CALAN SR) 240 MG extended release tablet  TAKE ONE TABLET BY MOUTH AT BEDTIME                   Medications marked \"taking\" at this time  No outpatient prescriptions have been marked as taking for the 7/6/18 encounter (Office Visit) with Jossy Goodson MD.        Medications patient taking as of now reconciled against medications ordered at time of hospital discharge: Yes    Vitals:    07/06/18 1137   BP:

## 2018-07-09 LAB
EKG ATRIAL RATE: 71 BPM
EKG P AXIS: 36 DEGREES
EKG P-R INTERVAL: 150 MS
EKG Q-T INTERVAL: 380 MS
EKG QRS DURATION: 96 MS
EKG QTC CALCULATION (BAZETT): 412 MS
EKG R AXIS: 17 DEGREES
EKG T AXIS: 39 DEGREES
EKG VENTRICULAR RATE: 71 BPM

## 2018-07-17 ENCOUNTER — TELEPHONE (OUTPATIENT)
Dept: CARDIOLOGY | Age: 58
End: 2018-07-17

## 2018-07-27 ENCOUNTER — OFFICE VISIT (OUTPATIENT)
Dept: CARDIOLOGY CLINIC | Age: 58
End: 2018-07-27
Payer: COMMERCIAL

## 2018-07-27 ENCOUNTER — HOSPITAL ENCOUNTER (OUTPATIENT)
Dept: CARDIOLOGY | Age: 58
Discharge: HOME | End: 2018-07-27
Payer: COMMERCIAL

## 2018-07-27 VITALS
WEIGHT: 171 LBS | DIASTOLIC BLOOD PRESSURE: 84 MMHG | RESPIRATION RATE: 16 BRPM | HEART RATE: 67 BPM | SYSTOLIC BLOOD PRESSURE: 120 MMHG | HEIGHT: 67 IN | BODY MASS INDEX: 26.84 KG/M2

## 2018-07-27 DIAGNOSIS — I48.91 ATRIAL FIBRILLATION, UNSPECIFIED TYPE (HCC): Primary | ICD-10-CM

## 2018-07-27 DIAGNOSIS — I31.9 PERICARDITIS, UNSPECIFIED CHRONICITY, UNSPECIFIED TYPE: ICD-10-CM

## 2018-07-27 LAB
LV EF: 50 %
LVEF MODALITY: NORMAL

## 2018-07-27 PROCEDURE — 99213 OFFICE O/P EST LOW 20 MIN: CPT | Performed by: INTERNAL MEDICINE

## 2018-07-27 PROCEDURE — 93306 TTE W/DOPPLER COMPLETE: CPT | Performed by: INTERNAL MEDICINE

## 2018-07-27 PROCEDURE — 93000 ELECTROCARDIOGRAM COMPLETE: CPT | Performed by: INTERNAL MEDICINE

## 2018-07-27 PROCEDURE — 93306 TTE W/DOPPLER COMPLETE: CPT

## 2018-07-27 RX ORDER — HYDROCHLOROTHIAZIDE 25 MG/1
25 TABLET ORAL DAILY
COMMUNITY
End: 2018-11-06 | Stop reason: SDUPTHER

## 2018-07-27 NOTE — PROGRESS NOTES
[facility]  Nani Akins M.D. Erlin Phelps. RAYO Tyler M.D.  Arnulfo Mota. Khris Rosen M.D. Sanjay Flor M.D. Johnie Carter M.D. Adryan Mcginnis   1960  Gregg Cordero DO      This 82-year-old man is seen today for outpatient follow-up. He was seen in cardiac consultation 06/27 18 as an inpatient because of atypical chest pain. He was seen in consultation by Dr. Otto Elaine and felt to have acute pericarditis. A pericardial  effusion was demonstrated but not tamponade. He was treated with the nonsteroidals and colchicine. since discharge she is returned to normal employment and physical activities. He only has rare episodes of mild chest discomfort upon deep inspiration. Medical History:  1. Hypertension  2. Stress myocardial perfusio 12/ 2016> normal perfusion and ejection fraction  3. TTE 10/ 2017 was essentially normal except for mild concentric LVH. 4. Treadmill  EKG 10/26/17 normal  5. No history MI,CVA or CHF  6. Presented to [facility] on 6/26/2018 with mid-sternal \"throbbing/pulsating pain\" that started  6/24/2018   7. ECHO: 6/27/2018: (Dr. Otto Elaine): Normal left ventricular systolic function, EF 91%, Mild LVH, Mild TR, PASP is estimated at 16 mmHg, Small-moderate sized circumferential pericardial effusion (right ventricular inversion in early diastole). No definitive tamponade physiology based on TV/MV   8. Labs 06/26/18: CRP 14.5.  Troponin normal d-dimer 765      Review of Systems:  Constitutional: negative for fever and chills  Respiratory: negative for cough and hemoptysis  Cardiovascular:   Gastrointestinal: negative for abdominal pain, diarrhea, nausea and vomiting  Genitourinary:negative for dysuria and hematuria  Derm: negative for rash and skin lesion(s)  Neurological: negative for seizures and tremors  Endocrine: negative for diabetic symptoms including polydipsia and polyuria  Musculoskeletal: negative for MOUTH AT BEDTIME, Disp: 90 tablet, Rfl: 1    naproxen (NAPROSYN) 500 MG tablet, Take 1 tablet by mouth 2 times daily (with meals) for 7 days, Disp: 14 tablet, Rfl: 0    Robert R. Francies Schilder, MD

## 2018-07-31 ENCOUNTER — TELEPHONE (OUTPATIENT)
Dept: FAMILY MEDICINE CLINIC | Age: 58
End: 2018-07-31

## 2018-07-31 DIAGNOSIS — I30.0 ACUTE IDIOPATHIC PERICARDITIS: Primary | ICD-10-CM

## 2018-07-31 RX ORDER — COLCHICINE 0.6 MG/1
0.6 TABLET ORAL 2 TIMES DAILY
Qty: 30 TABLET | Refills: 2 | Status: CANCELLED | OUTPATIENT
Start: 2018-07-31

## 2018-07-31 RX ORDER — COLCHICINE 0.6 MG/1
1 CAPSULE ORAL DAILY
Qty: 30 CAPSULE | Refills: 0 | Status: SHIPPED | OUTPATIENT
Start: 2018-07-31 | End: 2018-08-13 | Stop reason: SDUPTHER

## 2018-08-02 RX ORDER — COLCHICINE 0.6 MG/1
1 CAPSULE ORAL 2 TIMES DAILY
Qty: 30 CAPSULE | Refills: 0 | Status: CANCELLED | OUTPATIENT
Start: 2018-08-02

## 2018-08-02 NOTE — TELEPHONE ENCOUNTER
Pt states he is to take Colchrys bid and he does not take Colchicine  His insurance will pay for Colchrys at [address] , not colchicine  He is upset because it is not there and he needs it for a \"serious condition\"  Please review his med and if appropriate, send the colchrys to [employer]

## 2018-08-03 RX ORDER — COLCHICINE 0.6 MG/1
0.6 TABLET ORAL DAILY
Qty: 30 TABLET | Refills: 2 | Status: SHIPPED | OUTPATIENT
Start: 2018-08-03 | End: 2018-11-06

## 2018-08-10 NOTE — PROGRESS NOTES
DATE OF VISIT : 8/10/2018    Patient : Marcus Gould   Sex : male   Age : 66 y.o.  : 1960   MRN : [id number]       Chief Complaint   Patient presents with    Chest Pain     follow-up       Present Illness : F/u for pericarditis. Taking his colchicine and verapamil. Colchicine changed to daily due to interaction with verapamil. Following with cardiology. Pericardial effusion is stable since last ECHO. Chest pain has greatly improved and breathing has greatly improved. No sob, fevers, chill at this time. Past Medical History:   Diagnosis Date    Abdominal fibromatosis 2017    Atrial fibrillation (HCC)     Hyperlipidemia     Hypertension     Osteoarthritis     Sinusitis        Review of Systems :  Review of Systems   General ROS: negative  Respiratory ROS: no cough, shortness of breath, or wheezing  Cardiovascular ROS: no chest pain or dyspnea on exertion  Gastrointestinal ROS: no abdominal pain, change in bowel habits, or black or bloody stools  Genito-Urinary ROS: no dysuria, trouble voiding, or hematuria  Musculoskeletal ROS: negative    Rest of ROS as per HPI     PMHx: reviewed     Physical Exam :    VITAL SIGNS :   Vitals:    18 1607   BP: 130/74   Pulse: 70   Resp: 16   Temp: 98.7 °F (37.1 °C)   TempSrc: Oral   SpO2: 98%   Weight: 173 lb (78.5 kg)   Height: 5' 8\" (1.727 m)       General Appearance:  awake, alert, oriented, in no acute distress  Head/face:  NCAT  Lungs:  Normal expansion. Clear to auscultation. No rales, rhonchi, or wheezing. Heart:  Heart sounds are normal.  Regular rate and rhythm without murmur, gallop or rub. Abdomen:  Soft, non-tender, normal bowel sounds. No bruits, organomegaly or masses. Extremities: Extremities warm to touch, pink, with no edema. and pulses present in all extremities    Assessment & Plan :    Bia Bateman was seen today for chest pain.     Diagnoses and all orders for this visit:    Pericarditis        - cont colcrys and verapamil, colcrys daily due to potential increase in serum from verapamil, will need 30 more days of colcrys and then can be stopped, will continue following with cardiology, no new changes today   -     verapamil (CALAN SR) 240 MG extended release tablet; TAKE ONE TABLET BY MOUTH AT BEDTIME      Counseled regarding the possible side effects, risks, benefits and alternatives to treatment; patient and/or guardian verbalizes understanding, agrees, feels comfortable with and wishes to proceed with above treatment plan. Advised patient to call with any new medication issues, and read all Rx info from pharmacy to assure aware of all possible risks and side effects of medication before taking. Reviewed age and gender appropriate health screening exams and vaccinations. Advised patient regarding importance of keeping up with recommended health maintenance and to schedule as soon as possible if overdue, as this is important in assessing for undiagnosed pathology, especially cancer, as well as protecting against potentially harmful/life threatening disease. Patient and/or guardian verbalizes understanding and agrees with above counseling, assessment and plan. All questions answered. RTO in 2-3 months   ----------------------------------------------------------------  Signed by : Juliette Cleveland M.D.      Discussed with: Dr. Liyah Reynoso

## 2018-08-13 DIAGNOSIS — I30.0 ACUTE IDIOPATHIC PERICARDITIS: ICD-10-CM

## 2018-08-13 RX ORDER — COLCHICINE 0.6 MG/1
0.6 TABLET ORAL DAILY
Qty: 30 TABLET | Refills: 2 | OUTPATIENT
Start: 2018-08-13

## 2018-08-13 RX ORDER — COLCHICINE 0.6 MG/1
1 CAPSULE ORAL DAILY
Qty: 30 CAPSULE | Refills: 2 | Status: CANCELLED
Start: 2018-08-13

## 2018-08-13 RX ORDER — COLCHICINE 0.6 MG/1
0.6 TABLET ORAL DAILY
Qty: 30 TABLET | Refills: 3 | Status: SHIPPED | OUTPATIENT
Start: 2018-08-13 | End: 2018-08-17 | Stop reason: SDUPTHER

## 2018-08-17 ENCOUNTER — OFFICE VISIT (OUTPATIENT)
Dept: FAMILY MEDICINE CLINIC | Age: 58
End: 2018-08-17
Payer: COMMERCIAL

## 2018-08-17 VITALS
TEMPERATURE: 98.7 F | OXYGEN SATURATION: 98 % | DIASTOLIC BLOOD PRESSURE: 74 MMHG | HEART RATE: 70 BPM | BODY MASS INDEX: 26.22 KG/M2 | WEIGHT: 173 LBS | HEIGHT: 68 IN | SYSTOLIC BLOOD PRESSURE: 130 MMHG | RESPIRATION RATE: 16 BRPM

## 2018-08-17 DIAGNOSIS — I10 ESSENTIAL HYPERTENSION: ICD-10-CM

## 2018-08-17 PROCEDURE — 99212 OFFICE O/P EST SF 10 MIN: CPT | Performed by: STUDENT IN AN ORGANIZED HEALTH CARE EDUCATION/TRAINING PROGRAM

## 2018-08-17 PROCEDURE — 99213 OFFICE O/P EST LOW 20 MIN: CPT | Performed by: STUDENT IN AN ORGANIZED HEALTH CARE EDUCATION/TRAINING PROGRAM

## 2018-08-17 RX ORDER — VERAPAMIL HYDROCHLORIDE 240 MG/1
TABLET, FILM COATED, EXTENDED RELEASE ORAL
Qty: 90 TABLET | Refills: 1 | Status: SHIPPED | OUTPATIENT
Start: 2018-08-17 | End: 2018-11-06 | Stop reason: SDUPTHER

## 2018-08-17 NOTE — PROGRESS NOTES
FU pericarditis   On colchicine daily    Also taking verapamil    Chest pain much improved    No SOB   Sees Dr Birdie Rubinstein about the same    Blood pressure 130/74, pulse 70, temperature 98.7 °F (37.1 °C), temperature source Oral, resp. rate 16, height 5' 8\" (1.727 m), weight 173 lb (78.5 kg), SpO2 98 %. HEENT WNL     Heart regular    Lungs clear    abd non-tender      No edema    Pulses intact       Continue colchicine another 30 days  FU with Dr Dulce Sellers    Attending Physician Statement  I have discussed the case, including pertinent history and exam findings with the resident. I agree with the documented assessment and plan.

## 2018-08-27 ENCOUNTER — TELEPHONE (OUTPATIENT)
Dept: CARDIOLOGY CLINIC | Age: 58
End: 2018-08-27

## 2018-11-06 ENCOUNTER — OFFICE VISIT (OUTPATIENT)
Dept: FAMILY MEDICINE CLINIC | Age: 58
End: 2018-11-06
Payer: COMMERCIAL

## 2018-11-06 VITALS
DIASTOLIC BLOOD PRESSURE: 77 MMHG | SYSTOLIC BLOOD PRESSURE: 125 MMHG | BODY MASS INDEX: 27.94 KG/M2 | HEART RATE: 74 BPM | OXYGEN SATURATION: 96 % | WEIGHT: 178 LBS | HEIGHT: 67 IN | TEMPERATURE: 98.5 F

## 2018-11-06 DIAGNOSIS — E87.6 HYPOKALEMIA: ICD-10-CM

## 2018-11-06 DIAGNOSIS — I48.20 CHRONIC ATRIAL FIBRILLATION (HCC): Primary | ICD-10-CM

## 2018-11-06 DIAGNOSIS — I30.0 ACUTE IDIOPATHIC PERICARDITIS: ICD-10-CM

## 2018-11-06 DIAGNOSIS — I10 ESSENTIAL HYPERTENSION: ICD-10-CM

## 2018-11-06 PROCEDURE — 36415 COLL VENOUS BLD VENIPUNCTURE: CPT | Performed by: STUDENT IN AN ORGANIZED HEALTH CARE EDUCATION/TRAINING PROGRAM

## 2018-11-06 PROCEDURE — 99213 OFFICE O/P EST LOW 20 MIN: CPT | Performed by: STUDENT IN AN ORGANIZED HEALTH CARE EDUCATION/TRAINING PROGRAM

## 2018-11-06 PROCEDURE — 99212 OFFICE O/P EST SF 10 MIN: CPT | Performed by: STUDENT IN AN ORGANIZED HEALTH CARE EDUCATION/TRAINING PROGRAM

## 2018-11-06 RX ORDER — HYDROCHLOROTHIAZIDE 25 MG/1
25 TABLET ORAL DAILY
Qty: 30 TABLET | Refills: 1 | Status: SHIPPED | OUTPATIENT
Start: 2018-11-06 | End: 2019-02-08 | Stop reason: SDUPTHER

## 2018-11-06 RX ORDER — VERAPAMIL HYDROCHLORIDE 240 MG/1
TABLET, FILM COATED, EXTENDED RELEASE ORAL
Qty: 90 TABLET | Refills: 1 | Status: SHIPPED | OUTPATIENT
Start: 2018-11-06 | End: 2019-07-17 | Stop reason: SDUPTHER

## 2018-11-06 RX ORDER — POTASSIUM CHLORIDE 20 MEQ/1
20 TABLET, EXTENDED RELEASE ORAL DAILY
Qty: 30 TABLET | Refills: 3 | Status: SHIPPED | OUTPATIENT
Start: 2018-11-06 | End: 2019-05-20 | Stop reason: SDUPTHER

## 2018-11-06 RX ORDER — POTASSIUM CHLORIDE 750 MG/1
10 CAPSULE, EXTENDED RELEASE ORAL DAILY
Qty: 60 CAPSULE | Refills: 1 | Status: SHIPPED | OUTPATIENT
Start: 2018-11-06 | End: 2019-03-18 | Stop reason: ALTCHOICE

## 2018-11-06 ASSESSMENT — PATIENT HEALTH QUESTIONNAIRE - PHQ9
SUM OF ALL RESPONSES TO PHQ QUESTIONS 1-9: 0
SUM OF ALL RESPONSES TO PHQ9 QUESTIONS 1 & 2: 0
SUM OF ALL RESPONSES TO PHQ QUESTIONS 1-9: 0
2. FEELING DOWN, DEPRESSED OR HOPELESS: 0
1. LITTLE INTEREST OR PLEASURE IN DOING THINGS: 0

## 2019-01-11 ENCOUNTER — TELEPHONE (OUTPATIENT)
Dept: CARDIOLOGY CLINIC | Age: 59
End: 2019-01-11

## 2019-02-01 ENCOUNTER — OFFICE VISIT (OUTPATIENT)
Dept: CARDIOLOGY CLINIC | Age: 59
End: 2019-02-01
Payer: COMMERCIAL

## 2019-02-01 VITALS
WEIGHT: 188.4 LBS | BODY MASS INDEX: 29.57 KG/M2 | SYSTOLIC BLOOD PRESSURE: 134 MMHG | HEIGHT: 67 IN | DIASTOLIC BLOOD PRESSURE: 82 MMHG | HEART RATE: 64 BPM | RESPIRATION RATE: 16 BRPM

## 2019-02-01 DIAGNOSIS — I31.39 PERICARDIAL EFFUSION: ICD-10-CM

## 2019-02-01 DIAGNOSIS — I48.91 ATRIAL FIBRILLATION, UNSPECIFIED TYPE (HCC): Primary | ICD-10-CM

## 2019-02-01 PROCEDURE — 93000 ELECTROCARDIOGRAM COMPLETE: CPT | Performed by: INTERNAL MEDICINE

## 2019-02-01 PROCEDURE — 99213 OFFICE O/P EST LOW 20 MIN: CPT | Performed by: INTERNAL MEDICINE

## 2019-02-04 ENCOUNTER — HOSPITAL ENCOUNTER (OUTPATIENT)
Dept: CARDIOLOGY | Age: 59
Discharge: HOME | End: 2019-02-04
Payer: COMMERCIAL

## 2019-02-04 ENCOUNTER — OFFICE VISIT (OUTPATIENT)
Dept: FAMILY MEDICINE CLINIC | Age: 59
End: 2019-02-04
Payer: COMMERCIAL

## 2019-02-04 ENCOUNTER — HOSPITAL ENCOUNTER (OUTPATIENT)
Age: 59
Discharge: HOME | End: 2019-02-06
Payer: COMMERCIAL

## 2019-02-04 VITALS
HEART RATE: 75 BPM | DIASTOLIC BLOOD PRESSURE: 82 MMHG | TEMPERATURE: 98.6 F | OXYGEN SATURATION: 99 % | SYSTOLIC BLOOD PRESSURE: 130 MMHG | WEIGHT: 183 LBS | HEIGHT: 67 IN | BODY MASS INDEX: 28.72 KG/M2

## 2019-02-04 DIAGNOSIS — E87.6 HYPOKALEMIA: Primary | ICD-10-CM

## 2019-02-04 DIAGNOSIS — I31.39 PERICARDIAL EFFUSION: ICD-10-CM

## 2019-02-04 DIAGNOSIS — E87.6 HYPOKALEMIA: ICD-10-CM

## 2019-02-04 DIAGNOSIS — I10 ESSENTIAL HYPERTENSION: ICD-10-CM

## 2019-02-04 LAB
ANION GAP SERPL CALCULATED.3IONS-SCNC: 9 MMOL/L (ref 7–16)
BUN BLDV-MCNC: 17 MG/DL (ref 6–20)
CALCIUM SERPL-MCNC: 9.3 MG/DL (ref 8.6–10.2)
CHLORIDE BLD-SCNC: 98 MMOL/L (ref 98–107)
CO2: 29 MMOL/L (ref 22–29)
CREAT SERPL-MCNC: 1 MG/DL (ref 0.7–1.2)
GFR AFRICAN AMERICAN: >60
GFR NON-AFRICAN AMERICAN: >60 ML/MIN/1.73
GLUCOSE BLD-MCNC: 95 MG/DL (ref 74–99)
LV EF: 60 %
LVEF MODALITY: NORMAL
POTASSIUM SERPL-SCNC: 4.3 MMOL/L (ref 3.5–5)
SODIUM BLD-SCNC: 136 MMOL/L (ref 132–146)

## 2019-02-04 PROCEDURE — 99213 OFFICE O/P EST LOW 20 MIN: CPT | Performed by: STUDENT IN AN ORGANIZED HEALTH CARE EDUCATION/TRAINING PROGRAM

## 2019-02-04 PROCEDURE — 80048 BASIC METABOLIC PNL TOTAL CA: CPT

## 2019-02-04 PROCEDURE — 36415 COLL VENOUS BLD VENIPUNCTURE: CPT

## 2019-02-04 PROCEDURE — 93306 TTE W/DOPPLER COMPLETE: CPT

## 2019-02-04 PROCEDURE — 99212 OFFICE O/P EST SF 10 MIN: CPT | Performed by: STUDENT IN AN ORGANIZED HEALTH CARE EDUCATION/TRAINING PROGRAM

## 2019-02-04 ASSESSMENT — ENCOUNTER SYMPTOMS
ABDOMINAL PAIN: 0
VOMITING: 0
DIARRHEA: 0
CHOKING: 0
SHORTNESS OF BREATH: 0
CHEST TIGHTNESS: 0
COUGH: 0
ABDOMINAL DISTENTION: 0
NAUSEA: 0

## 2019-02-08 DIAGNOSIS — I10 ESSENTIAL HYPERTENSION: ICD-10-CM

## 2019-02-08 DIAGNOSIS — E87.6 HYPOKALEMIA: ICD-10-CM

## 2019-02-08 RX ORDER — POTASSIUM CHLORIDE 20 MEQ/1
TABLET, EXTENDED RELEASE ORAL
Qty: 60 TABLET | Refills: 0 | Status: SHIPPED | OUTPATIENT
Start: 2019-02-08 | End: 2019-03-16 | Stop reason: SDUPTHER

## 2019-02-08 RX ORDER — HYDROCHLOROTHIAZIDE 25 MG/1
TABLET ORAL
Qty: 30 TABLET | Refills: 0 | Status: SHIPPED | OUTPATIENT
Start: 2019-02-08 | End: 2019-06-19

## 2019-02-11 ENCOUNTER — TELEPHONE (OUTPATIENT)
Dept: CARDIOLOGY CLINIC | Age: 59
End: 2019-02-11

## 2019-02-27 DIAGNOSIS — I48.20 CHRONIC ATRIAL FIBRILLATION (HCC): ICD-10-CM

## 2019-03-16 DIAGNOSIS — I10 ESSENTIAL HYPERTENSION: ICD-10-CM

## 2019-03-16 DIAGNOSIS — E87.6 HYPOKALEMIA: ICD-10-CM

## 2019-03-18 RX ORDER — HYDROCHLOROTHIAZIDE 25 MG/1
TABLET ORAL
Qty: 30 TABLET | Refills: 0 | Status: SHIPPED | OUTPATIENT
Start: 2019-03-18 | End: 2019-06-19

## 2019-03-18 RX ORDER — POTASSIUM CHLORIDE 20 MEQ/1
TABLET, EXTENDED RELEASE ORAL
Qty: 60 TABLET | Refills: 0 | Status: SHIPPED | OUTPATIENT
Start: 2019-03-18 | End: 2019-05-18 | Stop reason: SDUPTHER

## 2019-04-15 RX ORDER — HYDROCHLOROTHIAZIDE 25 MG/1
TABLET ORAL
Qty: 30 TABLET | Refills: 0 | Status: SHIPPED | OUTPATIENT
Start: 2019-04-15 | End: 2019-06-19

## 2019-05-18 DIAGNOSIS — I10 ESSENTIAL HYPERTENSION: ICD-10-CM

## 2019-05-18 DIAGNOSIS — E87.6 HYPOKALEMIA: ICD-10-CM

## 2019-05-20 RX ORDER — POTASSIUM CHLORIDE 20 MEQ/1
TABLET, EXTENDED RELEASE ORAL
Qty: 60 TABLET | Refills: 0 | Status: SHIPPED | OUTPATIENT
Start: 2019-05-20 | End: 2019-09-25 | Stop reason: SDUPTHER

## 2019-05-31 DIAGNOSIS — I48.20 CHRONIC ATRIAL FIBRILLATION (HCC): ICD-10-CM

## 2019-06-03 ENCOUNTER — TELEPHONE (OUTPATIENT)
Dept: FAMILY MEDICINE CLINIC | Age: 59
End: 2019-06-03

## 2019-06-03 RX ORDER — APIXABAN 5 MG/1
TABLET, FILM COATED ORAL
Qty: 180 TABLET | Refills: 0 | Status: SHIPPED | OUTPATIENT
Start: 2019-06-03 | End: 2019-06-19

## 2019-06-04 ENCOUNTER — TELEPHONE (OUTPATIENT)
Dept: FAMILY MEDICINE CLINIC | Age: 59
End: 2019-06-04

## 2019-06-18 DIAGNOSIS — I48.20 CHRONIC ATRIAL FIBRILLATION (HCC): ICD-10-CM

## 2019-06-19 RX ORDER — HYDROCHLOROTHIAZIDE 25 MG/1
TABLET ORAL
Qty: 30 TABLET | Refills: 0 | Status: SHIPPED | OUTPATIENT
Start: 2019-06-19 | End: 2019-08-13 | Stop reason: SDUPTHER

## 2019-06-19 RX ORDER — APIXABAN 5 MG/1
TABLET, FILM COATED ORAL
Qty: 10 TABLET | Refills: 0 | Status: SHIPPED | OUTPATIENT
Start: 2019-06-19 | End: 2019-06-24 | Stop reason: SDUPTHER

## 2019-06-21 DIAGNOSIS — I48.20 CHRONIC ATRIAL FIBRILLATION (HCC): ICD-10-CM

## 2019-06-24 RX ORDER — APIXABAN 5 MG/1
TABLET, FILM COATED ORAL
Qty: 10 TABLET | Refills: 0 | Status: SHIPPED | OUTPATIENT
Start: 2019-06-24 | End: 2019-09-09 | Stop reason: ALTCHOICE

## 2019-07-08 ENCOUNTER — APPOINTMENT (OUTPATIENT)
Dept: GENERAL RADIOLOGY | Age: 59
End: 2019-07-08
Payer: COMMERCIAL

## 2019-07-08 ENCOUNTER — HOSPITAL ENCOUNTER (EMERGENCY)
Age: 59
Discharge: HOME | End: 2019-07-08
Attending: EMERGENCY MEDICINE
Payer: COMMERCIAL

## 2019-07-08 ENCOUNTER — OFFICE VISIT (OUTPATIENT)
Dept: FAMILY MEDICINE CLINIC | Age: 59
End: 2019-07-08
Payer: COMMERCIAL

## 2019-07-08 VITALS
WEIGHT: 190 LBS | TEMPERATURE: 98.6 F | HEART RATE: 65 BPM | BODY MASS INDEX: 29.82 KG/M2 | OXYGEN SATURATION: 98 % | SYSTOLIC BLOOD PRESSURE: 146 MMHG | RESPIRATION RATE: 18 BRPM | HEIGHT: 67 IN | DIASTOLIC BLOOD PRESSURE: 86 MMHG

## 2019-07-08 VITALS
RESPIRATION RATE: 16 BRPM | DIASTOLIC BLOOD PRESSURE: 83 MMHG | SYSTOLIC BLOOD PRESSURE: 135 MMHG | WEIGHT: 190 LBS | BODY MASS INDEX: 29.82 KG/M2 | HEART RATE: 54 BPM | OXYGEN SATURATION: 97 % | TEMPERATURE: 97 F | HEIGHT: 67 IN

## 2019-07-08 DIAGNOSIS — R07.9 CHEST PAIN IN ADULT: Primary | ICD-10-CM

## 2019-07-08 DIAGNOSIS — R07.9 CHEST PAIN, UNSPECIFIED TYPE: Primary | ICD-10-CM

## 2019-07-08 LAB
ALBUMIN SERPL-MCNC: 4.4 G/DL (ref 3.5–5.2)
ALP BLD-CCNC: 54 U/L (ref 40–129)
ALT SERPL-CCNC: 13 U/L (ref 0–40)
ANION GAP SERPL CALCULATED.3IONS-SCNC: 11 MMOL/L (ref 7–16)
AST SERPL-CCNC: 17 U/L (ref 0–39)
BASOPHILS ABSOLUTE: 0.02 E9/L (ref 0–0.2)
BASOPHILS RELATIVE PERCENT: 0.4 % (ref 0–2)
BILIRUB SERPL-MCNC: 0.6 MG/DL (ref 0–1.2)
BUN BLDV-MCNC: 23 MG/DL (ref 6–20)
C-REACTIVE PROTEIN: 0.3 MG/DL (ref 0–0.4)
CALCIUM SERPL-MCNC: 9.3 MG/DL (ref 8.6–10.2)
CHLORIDE BLD-SCNC: 99 MMOL/L (ref 98–107)
CO2: 29 MMOL/L (ref 22–29)
CREAT SERPL-MCNC: 1.1 MG/DL (ref 0.7–1.2)
EKG ATRIAL RATE: 58 BPM
EKG P AXIS: 37 DEGREES
EKG P-R INTERVAL: 178 MS
EKG Q-T INTERVAL: 396 MS
EKG QRS DURATION: 98 MS
EKG QTC CALCULATION (BAZETT): 388 MS
EKG R AXIS: 32 DEGREES
EKG T AXIS: 12 DEGREES
EKG VENTRICULAR RATE: 58 BPM
EOSINOPHILS ABSOLUTE: 0.22 E9/L (ref 0.05–0.5)
EOSINOPHILS RELATIVE PERCENT: 4.2 % (ref 0–6)
GFR AFRICAN AMERICAN: >60
GFR NON-AFRICAN AMERICAN: >60 ML/MIN/1.73
GLUCOSE BLD-MCNC: 98 MG/DL (ref 74–99)
HCT VFR BLD CALC: 40 % (ref 37–54)
HEMOGLOBIN: 14.5 G/DL (ref 12.5–16.5)
IMMATURE GRANULOCYTES #: 0.01 E9/L
IMMATURE GRANULOCYTES %: 0.2 % (ref 0–5)
LYMPHOCYTES ABSOLUTE: 0.97 E9/L (ref 1.5–4)
LYMPHOCYTES RELATIVE PERCENT: 18.6 % (ref 20–42)
MCH RBC QN AUTO: 31.9 PG (ref 26–35)
MCHC RBC AUTO-ENTMCNC: 36.3 % (ref 32–34.5)
MCV RBC AUTO: 87.9 FL (ref 80–99.9)
MONOCYTES ABSOLUTE: 0.52 E9/L (ref 0.1–0.95)
MONOCYTES RELATIVE PERCENT: 10 % (ref 2–12)
NEUTROPHILS ABSOLUTE: 3.48 E9/L (ref 1.8–7.3)
NEUTROPHILS RELATIVE PERCENT: 66.6 % (ref 43–80)
PDW BLD-RTO: 12.8 FL (ref 11.5–15)
PLATELET # BLD: 165 E9/L (ref 130–450)
PMV BLD AUTO: 11.8 FL (ref 7–12)
POTASSIUM SERPL-SCNC: 3.8 MMOL/L (ref 3.5–5)
PRO-BNP: 49 PG/ML (ref 0–125)
RBC # BLD: 4.55 E12/L (ref 3.8–5.8)
SEDIMENTATION RATE, ERYTHROCYTE: 20 MM/HR (ref 0–15)
SODIUM BLD-SCNC: 139 MMOL/L (ref 132–146)
TOTAL PROTEIN: 7.7 G/DL (ref 6.4–8.3)
TROPONIN: <0.01 NG/ML (ref 0–0.03)
WBC # BLD: 5.2 E9/L (ref 4.5–11.5)

## 2019-07-08 PROCEDURE — 71045 X-RAY EXAM CHEST 1 VIEW: CPT

## 2019-07-08 PROCEDURE — 36415 COLL VENOUS BLD VENIPUNCTURE: CPT

## 2019-07-08 PROCEDURE — 93010 ELECTROCARDIOGRAM REPORT: CPT | Performed by: FAMILY MEDICINE

## 2019-07-08 PROCEDURE — 93010 ELECTROCARDIOGRAM REPORT: CPT | Performed by: INTERNAL MEDICINE

## 2019-07-08 PROCEDURE — 99213 OFFICE O/P EST LOW 20 MIN: CPT | Performed by: FAMILY MEDICINE

## 2019-07-08 PROCEDURE — 93005 ELECTROCARDIOGRAM TRACING: CPT | Performed by: PHYSICIAN ASSISTANT

## 2019-07-08 PROCEDURE — 85025 COMPLETE CBC W/AUTO DIFF WBC: CPT

## 2019-07-08 PROCEDURE — 99285 EMERGENCY DEPT VISIT HI MDM: CPT

## 2019-07-08 PROCEDURE — 84484 ASSAY OF TROPONIN QUANT: CPT

## 2019-07-08 PROCEDURE — 80053 COMPREHEN METABOLIC PANEL: CPT

## 2019-07-08 PROCEDURE — 6370000000 HC RX 637 (ALT 250 FOR IP): Performed by: NURSE PRACTITIONER

## 2019-07-08 PROCEDURE — 85651 RBC SED RATE NONAUTOMATED: CPT

## 2019-07-08 PROCEDURE — 86140 C-REACTIVE PROTEIN: CPT

## 2019-07-08 PROCEDURE — 83880 ASSAY OF NATRIURETIC PEPTIDE: CPT

## 2019-07-08 PROCEDURE — 93005 ELECTROCARDIOGRAM TRACING: CPT | Performed by: FAMILY MEDICINE

## 2019-07-08 RX ORDER — METHYLPREDNISOLONE 4 MG/1
TABLET ORAL
Qty: 1 KIT | Refills: 0 | Status: SHIPPED | OUTPATIENT
Start: 2019-07-08 | End: 2019-07-14

## 2019-07-08 RX ORDER — ASPIRIN 81 MG/1
324 TABLET, CHEWABLE ORAL ONCE
Status: COMPLETED | OUTPATIENT
Start: 2019-07-08 | End: 2019-07-08

## 2019-07-08 RX ORDER — NAPROXEN 500 MG/1
500 TABLET ORAL DAILY PRN
COMMUNITY
End: 2019-07-12

## 2019-07-08 RX ADMIN — NITROGLYCERIN 0.5 INCH: 20 OINTMENT TOPICAL at 11:42

## 2019-07-08 RX ADMIN — ASPIRIN 81 MG 324 MG: 81 TABLET ORAL at 11:42

## 2019-07-08 ASSESSMENT — PAIN DESCRIPTION - DESCRIPTORS: DESCRIPTORS: CRAMPING;SHARP

## 2019-07-08 ASSESSMENT — PAIN SCALES - GENERAL: PAINLEVEL_OUTOF10: 1

## 2019-07-08 ASSESSMENT — PAIN DESCRIPTION - ORIENTATION: ORIENTATION: RIGHT

## 2019-07-08 ASSESSMENT — PAIN DESCRIPTION - FREQUENCY: FREQUENCY: INTERMITTENT

## 2019-07-08 ASSESSMENT — PAIN DESCRIPTION - LOCATION: LOCATION: CHEST

## 2019-07-08 ASSESSMENT — HEART SCORE: ECG: 1

## 2019-07-08 NOTE — PROGRESS NOTES
[facility] Outpatient Resident Progress Note       S: HPI : Joce Mcmahon  64 y.o. who presented for Chest Pain (for 10 days) and Headache    Chest pain: hx of afib on eliquis and pericarditis 1 year ago. This time same symptoms as prior. In the past had R shoulder pain and discomfort in the R chest and last year let it go for months and ended up with pericarditis. This time having the same symptom for about 2 weeks and now wants to be evaluated. Feels the same thing coming on again. No recent illness. No recent trauma. Sometimes does get palpitations, periodically but now more frequently - feels like a skipped beat. Has taken naproxen which did help. No fever, chills, sweats. No leg swelling. No sob. Does get more pain with bending over and some chest heaviness with bending over. I reviewed the patient's past medications, allergies and past medical history during this visit    Social: Joce Mcmahon  reports that he has never smoked. He has never used smokeless tobacco. He reports that he does not drink alcohol or use drugs. ROS:  See pertinent ROS as listed in HPI    O: PE: Vitals : Blood pressure (!) 146/86, pulse 65, temperature 98.6 °F (37 °C), temperature source Oral, resp. rate 18, height 5' 7\" (1.702 m), weight 190 lb (86.2 kg), SpO2 98 %. CONSTITUTIONAL:  awake, alert, cooperative, non-distressed, appears stated age  LUNGS:  No increased work of breathing, good air exchange, clear to auscultation bilaterally, no crackles or wheezing  CARDIOVASCULAR:  RRR, normal S1 and S2, and no murmur noted, no edema of the lower extermities; no chest wall tenderness   SKIN:  Warm and dry  MSK: no shoulder tenderness    Last labs:    Last labs / imaging reviewed    A / P:   Diagnosis Orders   1. Chest pain in adult  EKG 12 Lead     1. Atypical chest pain - based on hx, seems to be recurrence of pericarditis. Will need imaging for diagnosis. Also need to r/o other causes of chest pain.  Pt overall stable however, due to subacute onset and as pt is on blood thinner this makes him higher risk. Will sent to ED at this time. Report given. RTO: Return in about 2 weeks (around 7/22/2019).     Electronically signed by Viry Jeffrey MD on 7/8/2019 at 10:07 AM  This case was discussed with Dr Jarvis (s) Gravely    Future Appointments   Date Time Provider Weston Pittsi   7/17/2019  8:00 AM Guanaco Winters MD [id number] [facility]

## 2019-07-09 NOTE — ED PROVIDER NOTES
ED Attending  CC: Karley     Department of Emergency Medicine   ED  Provider Note  Admit Date/RoomTime: 7/8/2019 11:16 AM  ED Room: 36/36   Chief Complaint     Chest Pain (right sided chest pain radiating into right shoulder/right arm. )    History of Present Illness   Source of history provided by:  patient. History/Exam Limitations: none. Jordan Martinez is a 64 y.o. old male who has a past medical history of:   Past Medical History:   Diagnosis Date    Abdominal fibromatosis 2017    Atrial fibrillation (HCC)     Hyperlipidemia     Hypertension     Osteoarthritis     Sinusitis     presents to the emergency department by private vehicle, with complaints of intermittent episodes right chest discomfort described as dull beginning 2 week(s) prior to arrival.  The pain radiates to the right arm. Duration of symptoms: to the present time. Symptom(s) now is none. His symptoms are associated with nothing. The symptoms are worsened by bending and relieved by nothing. There has been No shortness of breath, No dyspnea on exertion, No orthopnea, No edema, No palpitations and No syncope associated with complaint. His cardiac risk factors are heart score 4. Care prior to arrival consisted of naproxen with some relief. He is on eliquis for hx of Afib and has a history of pericarditis. ROS    Pertinent positives and negatives are stated within HPI, all other systems reviewed and are negative. Past Surgical History:   Procedure Laterality Date    COLONOSCOPY  04/11/2012    KNEE ARTHROSCOPY  2009    left knee   Social History:  reports that he has never smoked. He has never used smokeless tobacco. He reports that he does not drink alcohol or use drugs. Family History: family history includes High Blood Pressure in his father and mother; No Known Problems in his brother and sister. Allergies: Patient has no known allergies.     Physical Exam           ED Triage Vitals [07/08/19 1029]   BP Temp Temp Source well-appearing, afebrile. Vital signs stable. Labs obtained, reassuring. EKG unchanged from previous. X-ray negative for acute infiltrate or fluid overload. Patient was sent to ED by family practice concerning for reoccurring pericarditis, exam not consistent with this, sed rate and CRP pending for outpatient follow-up. Will start a Medrol Dosepak as he is not a good candidate for NSAIDs secondary to being on Eliquis. He will be discharged home, educated on signs and symptoms which require emergent evaluation. Counseling:   I have spoken with the patient and discussed todays results, in addition to providing specific details for the plan of care and counseling regarding the diagnosis and prognosis and are agreeable with the plan. Assessment      1. Chest pain, unspecified type      This patient's ED course included: a personal history and physicial examination, re-evaluation prior to disposition, IV medications, cardiac monitoring and continuous pulse oximetry  This patient has remained hemodynamically stable during their ED course. Plan   Discharge to home. Patient condition is good. New Medications     Discharge Medication List as of 7/8/2019 12:45 PM      START taking these medications    Details   methylPREDNISolone (MEDROL, GUERITA,) 4 MG tablet Take by mouth., Disp-1 kit, R-0Print           Electronically signed by ELIZABETH Palacios CNP   DD: 7/9/19  **This report was transcribed using voice recognition software. Every effort was made to ensure accuracy; however, inadvertent computerized transcription errors may be present.   END OF PROVIDER NOTE      ELIZABETH Barbour CNP  07/09/19 0348

## 2019-07-12 ENCOUNTER — OFFICE VISIT (OUTPATIENT)
Dept: FAMILY MEDICINE CLINIC | Age: 59
End: 2019-07-12
Payer: COMMERCIAL

## 2019-07-12 VITALS
SYSTOLIC BLOOD PRESSURE: 138 MMHG | HEIGHT: 67 IN | WEIGHT: 189 LBS | HEART RATE: 66 BPM | DIASTOLIC BLOOD PRESSURE: 80 MMHG | BODY MASS INDEX: 29.66 KG/M2 | OXYGEN SATURATION: 95 %

## 2019-07-12 DIAGNOSIS — R07.9 CHEST PAIN IN ADULT: Primary | ICD-10-CM

## 2019-07-12 DIAGNOSIS — I48.20 CHRONIC ATRIAL FIBRILLATION (HCC): ICD-10-CM

## 2019-07-12 DIAGNOSIS — I10 ESSENTIAL HYPERTENSION: ICD-10-CM

## 2019-07-12 PROCEDURE — 99212 OFFICE O/P EST SF 10 MIN: CPT | Performed by: FAMILY MEDICINE

## 2019-07-12 PROCEDURE — 99213 OFFICE O/P EST LOW 20 MIN: CPT | Performed by: FAMILY MEDICINE

## 2019-07-12 ASSESSMENT — PATIENT HEALTH QUESTIONNAIRE - PHQ9
1. LITTLE INTEREST OR PLEASURE IN DOING THINGS: 0
SUM OF ALL RESPONSES TO PHQ9 QUESTIONS 1 & 2: 0
SUM OF ALL RESPONSES TO PHQ QUESTIONS 1-9: 0
2. FEELING DOWN, DEPRESSED OR HOPELESS: 0
SUM OF ALL RESPONSES TO PHQ QUESTIONS 1-9: 0

## 2019-07-12 NOTE — PROGRESS NOTES
S: 64 y.o. male here for f/u of ER for CP. Troponin, ESR, CRP, CBC and CMP and BNP wnl. Started on medrol dosepak. Pain has improved. No palps, sob, n/v.   H/o pericarditis and Afib. On eliquis. O: VS: BP (!) 150/80   Pulse 66   Ht 5' 7\" (1.702 m)   Wt 189 lb (85.7 kg)   SpO2 95%   BMI 29.60 kg/m²    General: NAD, alert and interacting appropriately. CV:  RRR, no gallops, rubs, or murmurs    Resp: CTAB   Ext:  No edema    Impression: CP resolved. 2/2 MSK > GERD > anxiety > ACS vs pericarditis   Plan:   Cont steroid  F/u w/ Cards  F/u w/ PCP in 3 mo for HTN    Attending Physician Statement  I have discussed the case, including pertinent history and exam findings with the resident. I agree with the documented assessment and plan.

## 2019-07-17 ENCOUNTER — OFFICE VISIT (OUTPATIENT)
Dept: CARDIOLOGY CLINIC | Age: 59
End: 2019-07-17
Payer: COMMERCIAL

## 2019-07-17 VITALS
DIASTOLIC BLOOD PRESSURE: 88 MMHG | HEIGHT: 67 IN | BODY MASS INDEX: 29.35 KG/M2 | HEART RATE: 75 BPM | WEIGHT: 187 LBS | SYSTOLIC BLOOD PRESSURE: 128 MMHG | RESPIRATION RATE: 14 BRPM

## 2019-07-17 DIAGNOSIS — I48.91 ATRIAL FIBRILLATION, UNSPECIFIED TYPE (HCC): Primary | ICD-10-CM

## 2019-07-17 DIAGNOSIS — I10 ESSENTIAL HYPERTENSION: ICD-10-CM

## 2019-07-17 PROCEDURE — 99213 OFFICE O/P EST LOW 20 MIN: CPT | Performed by: INTERNAL MEDICINE

## 2019-07-17 PROCEDURE — 93000 ELECTROCARDIOGRAM COMPLETE: CPT | Performed by: INTERNAL MEDICINE

## 2019-07-17 RX ORDER — VERAPAMIL HYDROCHLORIDE 240 MG/1
TABLET, FILM COATED, EXTENDED RELEASE ORAL
Qty: 30 TABLET | Refills: 0 | Status: SHIPPED | OUTPATIENT
Start: 2019-07-17 | End: 2019-09-25 | Stop reason: SDUPTHER

## 2019-08-13 RX ORDER — HYDROCHLOROTHIAZIDE 25 MG/1
TABLET ORAL
Qty: 30 TABLET | Refills: 0 | Status: SHIPPED | OUTPATIENT
Start: 2019-08-13 | End: 2019-09-25 | Stop reason: SDUPTHER

## 2019-09-06 DIAGNOSIS — I48.20 CHRONIC ATRIAL FIBRILLATION (HCC): ICD-10-CM

## 2019-09-09 RX ORDER — APIXABAN 5 MG/1
TABLET, FILM COATED ORAL
Qty: 180 TABLET | Refills: 4 | Status: SHIPPED | OUTPATIENT
Start: 2019-09-09 | End: 2019-12-08

## 2019-09-25 DIAGNOSIS — E87.6 HYPOKALEMIA: ICD-10-CM

## 2019-09-25 DIAGNOSIS — I10 ESSENTIAL HYPERTENSION: ICD-10-CM

## 2019-09-25 RX ORDER — POTASSIUM CHLORIDE 20 MEQ/1
TABLET, EXTENDED RELEASE ORAL
Qty: 60 TABLET | Refills: 0 | Status: SHIPPED | OUTPATIENT
Start: 2019-09-25 | End: 2019-10-28 | Stop reason: SDUPTHER

## 2019-09-25 RX ORDER — HYDROCHLOROTHIAZIDE 25 MG/1
TABLET ORAL
Qty: 30 TABLET | Refills: 3 | Status: SHIPPED | OUTPATIENT
Start: 2019-09-25 | End: 2019-12-23 | Stop reason: SDUPTHER

## 2019-09-25 RX ORDER — VERAPAMIL HYDROCHLORIDE 240 MG/1
240 TABLET, FILM COATED, EXTENDED RELEASE ORAL NIGHTLY
Qty: 30 TABLET | Refills: 3 | Status: SHIPPED | OUTPATIENT
Start: 2019-09-25 | End: 2019-12-23 | Stop reason: SDUPTHER

## 2019-10-24 ENCOUNTER — APPOINTMENT (OUTPATIENT)
Dept: CT IMAGING | Age: 59
End: 2019-10-24
Payer: COMMERCIAL

## 2019-10-24 LAB
ALBUMIN SERPL-MCNC: 4.7 G/DL (ref 3.5–5.2)
ALP BLD-CCNC: 50 U/L (ref 40–129)
ALT SERPL-CCNC: 17 U/L (ref 0–40)
ANION GAP SERPL CALCULATED.3IONS-SCNC: 8 MMOL/L (ref 7–16)
AST SERPL-CCNC: 21 U/L (ref 0–39)
BASOPHILS ABSOLUTE: 0.02 E9/L (ref 0–0.2)
BASOPHILS RELATIVE PERCENT: 0.3 % (ref 0–2)
BILIRUB SERPL-MCNC: 0.6 MG/DL (ref 0–1.2)
BUN BLDV-MCNC: 19 MG/DL (ref 6–20)
CALCIUM SERPL-MCNC: 9.7 MG/DL (ref 8.6–10.2)
CHLORIDE BLD-SCNC: 98 MMOL/L (ref 98–107)
CO2: 33 MMOL/L (ref 22–29)
CREAT SERPL-MCNC: 1 MG/DL (ref 0.7–1.2)
EOSINOPHILS ABSOLUTE: 0.18 E9/L (ref 0.05–0.5)
EOSINOPHILS RELATIVE PERCENT: 3 % (ref 0–6)
GFR AFRICAN AMERICAN: >60
GFR NON-AFRICAN AMERICAN: >60 ML/MIN/1.73
GLUCOSE BLD-MCNC: 94 MG/DL (ref 74–99)
HCT VFR BLD CALC: 42.3 % (ref 37–54)
HEMOGLOBIN: 15 G/DL (ref 12.5–16.5)
IMMATURE GRANULOCYTES #: 0.01 E9/L
IMMATURE GRANULOCYTES %: 0.2 % (ref 0–5)
INR BLD: 1.3
LYMPHOCYTES ABSOLUTE: 1.33 E9/L (ref 1.5–4)
LYMPHOCYTES RELATIVE PERCENT: 21.8 % (ref 20–42)
MCH RBC QN AUTO: 31.4 PG (ref 26–35)
MCHC RBC AUTO-ENTMCNC: 35.5 % (ref 32–34.5)
MCV RBC AUTO: 88.7 FL (ref 80–99.9)
MONOCYTES ABSOLUTE: 0.65 E9/L (ref 0.1–0.95)
MONOCYTES RELATIVE PERCENT: 10.7 % (ref 2–12)
NEUTROPHILS ABSOLUTE: 3.91 E9/L (ref 1.8–7.3)
NEUTROPHILS RELATIVE PERCENT: 64 % (ref 43–80)
PDW BLD-RTO: 12.4 FL (ref 11.5–15)
PLATELET # BLD: 166 E9/L (ref 130–450)
PMV BLD AUTO: 11.5 FL (ref 7–12)
POTASSIUM SERPL-SCNC: 3.7 MMOL/L (ref 3.5–5)
PROTHROMBIN TIME: 15.1 SEC (ref 9.3–12.4)
RBC # BLD: 4.77 E12/L (ref 3.8–5.8)
SODIUM BLD-SCNC: 139 MMOL/L (ref 132–146)
TOTAL PROTEIN: 7.7 G/DL (ref 6.4–8.3)
WBC # BLD: 6.1 E9/L (ref 4.5–11.5)

## 2019-10-24 PROCEDURE — 85610 PROTHROMBIN TIME: CPT

## 2019-10-24 PROCEDURE — 80053 COMPREHEN METABOLIC PANEL: CPT

## 2019-10-24 PROCEDURE — 70450 CT HEAD/BRAIN W/O DYE: CPT

## 2019-10-24 PROCEDURE — 85025 COMPLETE CBC W/AUTO DIFF WBC: CPT

## 2019-10-24 PROCEDURE — 36415 COLL VENOUS BLD VENIPUNCTURE: CPT

## 2019-10-24 ASSESSMENT — PAIN SCALES - GENERAL: PAINLEVEL_OUTOF10: 4

## 2019-10-24 ASSESSMENT — PAIN DESCRIPTION - DESCRIPTORS: DESCRIPTORS: HEADACHE

## 2019-10-24 ASSESSMENT — PAIN DESCRIPTION - LOCATION: LOCATION: HEAD

## 2019-10-24 ASSESSMENT — PAIN DESCRIPTION - PAIN TYPE: TYPE: CHRONIC PAIN

## 2019-10-24 ASSESSMENT — PAIN DESCRIPTION - ORIENTATION: ORIENTATION: POSTERIOR

## 2019-10-25 ENCOUNTER — HOSPITAL ENCOUNTER (EMERGENCY)
Age: 59
Discharge: HOME | End: 2019-10-25
Payer: COMMERCIAL

## 2019-10-25 VITALS
TEMPERATURE: 97.2 F | BODY MASS INDEX: 28.25 KG/M2 | HEART RATE: 60 BPM | WEIGHT: 180 LBS | DIASTOLIC BLOOD PRESSURE: 88 MMHG | SYSTOLIC BLOOD PRESSURE: 136 MMHG | HEIGHT: 67 IN | RESPIRATION RATE: 16 BRPM | OXYGEN SATURATION: 97 %

## 2019-10-25 DIAGNOSIS — R22.0 SUBCUTANEOUS NODULE OF HEAD: ICD-10-CM

## 2019-10-25 DIAGNOSIS — R51.9 NONINTRACTABLE HEADACHE, UNSPECIFIED CHRONICITY PATTERN, UNSPECIFIED HEADACHE TYPE: Primary | ICD-10-CM

## 2019-10-25 PROCEDURE — 2580000003 HC RX 258: Performed by: NURSE PRACTITIONER

## 2019-10-25 PROCEDURE — 96375 TX/PRO/DX INJ NEW DRUG ADDON: CPT

## 2019-10-25 PROCEDURE — 6360000002 HC RX W HCPCS: Performed by: NURSE PRACTITIONER

## 2019-10-25 PROCEDURE — 96374 THER/PROPH/DIAG INJ IV PUSH: CPT

## 2019-10-25 PROCEDURE — 99284 EMERGENCY DEPT VISIT MOD MDM: CPT

## 2019-10-25 RX ORDER — 0.9 % SODIUM CHLORIDE 0.9 %
1000 INTRAVENOUS SOLUTION INTRAVENOUS ONCE
Status: COMPLETED | OUTPATIENT
Start: 2019-10-25 | End: 2019-10-25

## 2019-10-25 RX ORDER — DIPHENHYDRAMINE HYDROCHLORIDE 50 MG/ML
25 INJECTION INTRAMUSCULAR; INTRAVENOUS ONCE
Status: COMPLETED | OUTPATIENT
Start: 2019-10-25 | End: 2019-10-25

## 2019-10-25 RX ORDER — PROCHLORPERAZINE EDISYLATE 5 MG/ML
10 INJECTION INTRAMUSCULAR; INTRAVENOUS ONCE
Status: COMPLETED | OUTPATIENT
Start: 2019-10-25 | End: 2019-10-25

## 2019-10-25 RX ORDER — DEXAMETHASONE SODIUM PHOSPHATE 10 MG/ML
10 INJECTION INTRAMUSCULAR; INTRAVENOUS ONCE
Status: COMPLETED | OUTPATIENT
Start: 2019-10-25 | End: 2019-10-25

## 2019-10-25 RX ADMIN — DIPHENHYDRAMINE HYDROCHLORIDE 25 MG: 50 INJECTION, SOLUTION INTRAMUSCULAR; INTRAVENOUS at 02:08

## 2019-10-25 RX ADMIN — PROCHLORPERAZINE EDISYLATE 10 MG: 5 INJECTION INTRAMUSCULAR; INTRAVENOUS at 02:07

## 2019-10-25 RX ADMIN — DEXAMETHASONE SODIUM PHOSPHATE 10 MG: 10 INJECTION INTRAMUSCULAR; INTRAVENOUS at 02:09

## 2019-10-25 RX ADMIN — SODIUM CHLORIDE 1000 ML: 9 INJECTION, SOLUTION INTRAVENOUS at 02:06

## 2019-10-28 ENCOUNTER — OFFICE VISIT (OUTPATIENT)
Dept: FAMILY MEDICINE CLINIC | Age: 59
End: 2019-10-28
Payer: COMMERCIAL

## 2019-10-28 VITALS
BODY MASS INDEX: 29.66 KG/M2 | OXYGEN SATURATION: 94 % | RESPIRATION RATE: 18 BRPM | SYSTOLIC BLOOD PRESSURE: 146 MMHG | HEIGHT: 67 IN | DIASTOLIC BLOOD PRESSURE: 79 MMHG | WEIGHT: 189 LBS | HEART RATE: 75 BPM | TEMPERATURE: 98.4 F

## 2019-10-28 DIAGNOSIS — H54.7 DECREASED VISION: ICD-10-CM

## 2019-10-28 DIAGNOSIS — J30.9 ALLERGIC RHINITIS, UNSPECIFIED SEASONALITY, UNSPECIFIED TRIGGER: Primary | ICD-10-CM

## 2019-10-28 DIAGNOSIS — E87.6 HYPOKALEMIA: ICD-10-CM

## 2019-10-28 PROCEDURE — 99212 OFFICE O/P EST SF 10 MIN: CPT | Performed by: STUDENT IN AN ORGANIZED HEALTH CARE EDUCATION/TRAINING PROGRAM

## 2019-10-28 PROCEDURE — 99213 OFFICE O/P EST LOW 20 MIN: CPT | Performed by: STUDENT IN AN ORGANIZED HEALTH CARE EDUCATION/TRAINING PROGRAM

## 2019-10-28 RX ORDER — FLUTICASONE PROPIONATE 50 MCG
2 SPRAY, SUSPENSION (ML) NASAL DAILY
Qty: 1 BOTTLE | Refills: 5 | Status: SHIPPED
Start: 2019-10-28 | End: 2020-02-21 | Stop reason: ALTCHOICE

## 2019-10-28 RX ORDER — POTASSIUM CHLORIDE 20 MEQ/1
TABLET, EXTENDED RELEASE ORAL
Qty: 60 TABLET | Refills: 0 | Status: SHIPPED | OUTPATIENT
Start: 2019-10-28 | End: 2019-12-23 | Stop reason: SDUPTHER

## 2019-10-28 ASSESSMENT — ENCOUNTER SYMPTOMS
SINUS PRESSURE: 0
COUGH: 0
VOMITING: 0
SINUS PAIN: 0
SHORTNESS OF BREATH: 0
DIARRHEA: 0
RHINORRHEA: 1
NAUSEA: 0

## 2019-10-29 ENCOUNTER — OFFICE VISIT (OUTPATIENT)
Dept: NEUROLOGY | Age: 59
End: 2019-10-29
Payer: COMMERCIAL

## 2019-10-29 ENCOUNTER — TELEPHONE (OUTPATIENT)
Dept: NEUROLOGY | Age: 59
End: 2019-10-29

## 2019-10-29 VITALS
WEIGHT: 189 LBS | DIASTOLIC BLOOD PRESSURE: 84 MMHG | RESPIRATION RATE: 20 BRPM | BODY MASS INDEX: 29.66 KG/M2 | SYSTOLIC BLOOD PRESSURE: 124 MMHG | HEART RATE: 72 BPM | HEIGHT: 67 IN

## 2019-10-29 DIAGNOSIS — H53.2 DIPLOPIA: Primary | ICD-10-CM

## 2019-10-29 PROCEDURE — 99205 OFFICE O/P NEW HI 60 MIN: CPT | Performed by: CLINICAL NURSE SPECIALIST

## 2019-10-30 ENCOUNTER — HOSPITAL ENCOUNTER (OUTPATIENT)
Age: 59
Discharge: HOME | End: 2019-10-30
Payer: COMMERCIAL

## 2019-10-30 DIAGNOSIS — H53.2 DIPLOPIA: ICD-10-CM

## 2019-10-30 PROCEDURE — 83519 RIA NONANTIBODY: CPT

## 2019-10-30 PROCEDURE — 83516 IMMUNOASSAY NONANTIBODY: CPT

## 2019-10-31 ENCOUNTER — HOSPITAL ENCOUNTER (OUTPATIENT)
Dept: MRI IMAGING | Age: 59
Discharge: HOME | End: 2019-11-02
Payer: COMMERCIAL

## 2019-10-31 DIAGNOSIS — H53.2 DIPLOPIA: ICD-10-CM

## 2019-10-31 PROCEDURE — A9579 GAD-BASE MR CONTRAST NOS,1ML: HCPCS | Performed by: RADIOLOGY

## 2019-10-31 PROCEDURE — 6360000004 HC RX CONTRAST MEDICATION: Performed by: RADIOLOGY

## 2019-10-31 PROCEDURE — 70553 MRI BRAIN STEM W/O & W/DYE: CPT

## 2019-10-31 RX ADMIN — GADOTERIDOL 17 ML: 279.3 INJECTION, SOLUTION INTRAVENOUS at 13:25

## 2019-11-02 LAB
ACETYLCHOLINE BINDING ANTIBODY: 0 NMOL/L (ref 0–0.4)
ACETYLCHOLINE BLOCKING AB: 0 % (ref 0–26)

## 2019-11-04 ENCOUNTER — TELEPHONE (OUTPATIENT)
Dept: FAMILY MEDICINE CLINIC | Age: 59
End: 2019-11-04

## 2019-11-08 ENCOUNTER — OFFICE VISIT (OUTPATIENT)
Dept: FAMILY MEDICINE CLINIC | Age: 59
End: 2019-11-08
Payer: COMMERCIAL

## 2019-11-08 VITALS
WEIGHT: 191.5 LBS | RESPIRATION RATE: 18 BRPM | DIASTOLIC BLOOD PRESSURE: 78 MMHG | TEMPERATURE: 98.7 F | SYSTOLIC BLOOD PRESSURE: 138 MMHG | BODY MASS INDEX: 30.06 KG/M2 | HEIGHT: 67 IN | HEART RATE: 67 BPM

## 2019-11-08 DIAGNOSIS — H54.7 DECREASED VISION: Primary | ICD-10-CM

## 2019-11-08 DIAGNOSIS — R51.9 NONINTRACTABLE HEADACHE, UNSPECIFIED CHRONICITY PATTERN, UNSPECIFIED HEADACHE TYPE: ICD-10-CM

## 2019-11-08 PROCEDURE — 99213 OFFICE O/P EST LOW 20 MIN: CPT | Performed by: STUDENT IN AN ORGANIZED HEALTH CARE EDUCATION/TRAINING PROGRAM

## 2019-11-08 PROCEDURE — 99212 OFFICE O/P EST SF 10 MIN: CPT | Performed by: STUDENT IN AN ORGANIZED HEALTH CARE EDUCATION/TRAINING PROGRAM

## 2019-11-08 ASSESSMENT — ENCOUNTER SYMPTOMS
ABDOMINAL DISTENTION: 0
COUGH: 0
SHORTNESS OF BREATH: 0
DIARRHEA: 0
CONSTIPATION: 0
NAUSEA: 0
VOMITING: 0

## 2019-12-13 ENCOUNTER — OFFICE VISIT (OUTPATIENT)
Dept: NEUROLOGY | Age: 59
End: 2019-12-13
Payer: COMMERCIAL

## 2019-12-13 VITALS
DIASTOLIC BLOOD PRESSURE: 83 MMHG | HEART RATE: 88 BPM | OXYGEN SATURATION: 95 % | HEIGHT: 67 IN | BODY MASS INDEX: 28.25 KG/M2 | WEIGHT: 180 LBS | SYSTOLIC BLOOD PRESSURE: 150 MMHG | RESPIRATION RATE: 18 BRPM

## 2019-12-13 DIAGNOSIS — H53.2 DIPLOPIA: Primary | ICD-10-CM

## 2019-12-13 PROCEDURE — 99214 OFFICE O/P EST MOD 30 MIN: CPT | Performed by: CLINICAL NURSE SPECIALIST

## 2019-12-23 DIAGNOSIS — E87.6 HYPOKALEMIA: ICD-10-CM

## 2019-12-23 DIAGNOSIS — I10 ESSENTIAL HYPERTENSION: ICD-10-CM

## 2019-12-23 RX ORDER — HYDROCHLOROTHIAZIDE 25 MG/1
TABLET ORAL
Qty: 30 TABLET | Refills: 3 | Status: SHIPPED
Start: 2019-12-23 | End: 2020-02-21 | Stop reason: SDUPTHER

## 2019-12-23 RX ORDER — POTASSIUM CHLORIDE 20 MEQ/1
TABLET, EXTENDED RELEASE ORAL
Qty: 60 TABLET | Refills: 0 | Status: SHIPPED | OUTPATIENT
Start: 2019-12-23 | End: 2020-01-15

## 2019-12-23 RX ORDER — VERAPAMIL HYDROCHLORIDE 240 MG/1
240 TABLET, FILM COATED, EXTENDED RELEASE ORAL NIGHTLY
Qty: 30 TABLET | Refills: 3 | Status: SHIPPED
Start: 2019-12-23 | End: 2020-02-21 | Stop reason: SDUPTHER

## 2020-01-15 RX ORDER — POTASSIUM CHLORIDE 20 MEQ/1
TABLET, EXTENDED RELEASE ORAL
Qty: 15 TABLET | Refills: 0 | Status: SHIPPED
Start: 2020-01-15 | End: 2020-02-21 | Stop reason: SDUPTHER

## 2020-02-17 ENCOUNTER — TELEPHONE (OUTPATIENT)
Dept: FAMILY MEDICINE CLINIC | Age: 60
End: 2020-02-17

## 2020-02-21 ENCOUNTER — HOSPITAL ENCOUNTER (OUTPATIENT)
Age: 60
Discharge: HOME | End: 2020-02-21
Payer: COMMERCIAL

## 2020-02-21 ENCOUNTER — OFFICE VISIT (OUTPATIENT)
Dept: FAMILY MEDICINE CLINIC | Age: 60
End: 2020-02-21
Payer: COMMERCIAL

## 2020-02-21 VITALS
SYSTOLIC BLOOD PRESSURE: 133 MMHG | OXYGEN SATURATION: 98 % | HEIGHT: 68 IN | HEART RATE: 79 BPM | TEMPERATURE: 99 F | RESPIRATION RATE: 16 BRPM | BODY MASS INDEX: 28.95 KG/M2 | WEIGHT: 191 LBS | DIASTOLIC BLOOD PRESSURE: 73 MMHG

## 2020-02-21 LAB
ANION GAP SERPL CALCULATED.3IONS-SCNC: 14 MMOL/L (ref 7–16)
BUN BLDV-MCNC: 19 MG/DL (ref 6–20)
CALCIUM SERPL-MCNC: 9.3 MG/DL (ref 8.6–10.2)
CHLORIDE BLD-SCNC: 100 MMOL/L (ref 98–107)
CO2: 24 MMOL/L (ref 22–29)
CREAT SERPL-MCNC: 1 MG/DL (ref 0.7–1.2)
FOLATE: 4.9 NG/ML (ref 4.8–24.2)
GFR AFRICAN AMERICAN: >60
GFR NON-AFRICAN AMERICAN: >60 ML/MIN/1.73
GLUCOSE BLD-MCNC: 102 MG/DL (ref 74–99)
HBA1C MFR BLD: 5 % (ref 4–5.6)
POTASSIUM SERPL-SCNC: 3.9 MMOL/L (ref 3.5–5)
SODIUM BLD-SCNC: 138 MMOL/L (ref 132–146)
T4 FREE: 1.23 NG/DL (ref 0.93–1.7)
TOTAL CK: 327 U/L (ref 20–200)
TSH SERPL DL<=0.05 MIU/L-ACNC: 2.93 UIU/ML (ref 0.27–4.2)
VITAMIN B-12: 347 PG/ML (ref 211–946)

## 2020-02-21 PROCEDURE — 82746 ASSAY OF FOLIC ACID SERUM: CPT

## 2020-02-21 PROCEDURE — 86235 NUCLEAR ANTIGEN ANTIBODY: CPT

## 2020-02-21 PROCEDURE — 83036 HEMOGLOBIN GLYCOSYLATED A1C: CPT

## 2020-02-21 PROCEDURE — 82550 ASSAY OF CK (CPK): CPT

## 2020-02-21 PROCEDURE — 83519 RIA NONANTIBODY: CPT

## 2020-02-21 PROCEDURE — 99213 OFFICE O/P EST LOW 20 MIN: CPT | Performed by: STUDENT IN AN ORGANIZED HEALTH CARE EDUCATION/TRAINING PROGRAM

## 2020-02-21 PROCEDURE — 84443 ASSAY THYROID STIM HORMONE: CPT

## 2020-02-21 PROCEDURE — 82607 VITAMIN B-12: CPT

## 2020-02-21 PROCEDURE — 80048 BASIC METABOLIC PNL TOTAL CA: CPT

## 2020-02-21 PROCEDURE — 99212 OFFICE O/P EST SF 10 MIN: CPT | Performed by: STUDENT IN AN ORGANIZED HEALTH CARE EDUCATION/TRAINING PROGRAM

## 2020-02-21 PROCEDURE — 86038 ANTINUCLEAR ANTIBODIES: CPT

## 2020-02-21 PROCEDURE — 84439 ASSAY OF FREE THYROXINE: CPT

## 2020-02-21 PROCEDURE — 36415 COLL VENOUS BLD VENIPUNCTURE: CPT

## 2020-02-21 PROCEDURE — 86255 FLUORESCENT ANTIBODY SCREEN: CPT

## 2020-02-21 RX ORDER — HYDROCHLOROTHIAZIDE 25 MG/1
TABLET ORAL
Qty: 30 TABLET | Refills: 3 | Status: SHIPPED
Start: 2020-02-21 | End: 2020-09-15 | Stop reason: SDUPTHER

## 2020-02-21 RX ORDER — POTASSIUM CHLORIDE 20 MEQ/1
TABLET, EXTENDED RELEASE ORAL
Qty: 100 TABLET | Refills: 1 | Status: SHIPPED
Start: 2020-02-21 | End: 2020-03-27 | Stop reason: SDUPTHER

## 2020-02-21 RX ORDER — VERAPAMIL HYDROCHLORIDE 240 MG/1
240 TABLET, FILM COATED, EXTENDED RELEASE ORAL NIGHTLY
Qty: 30 TABLET | Refills: 3 | Status: SHIPPED
Start: 2020-02-21 | End: 2020-04-29 | Stop reason: SDUPTHER

## 2020-02-21 ASSESSMENT — PATIENT HEALTH QUESTIONNAIRE - PHQ9
SUM OF ALL RESPONSES TO PHQ9 QUESTIONS 1 & 2: 0
1. LITTLE INTEREST OR PLEASURE IN DOING THINGS: 0
SUM OF ALL RESPONSES TO PHQ QUESTIONS 1-9: 0
2. FEELING DOWN, DEPRESSED OR HOPELESS: 0
SUM OF ALL RESPONSES TO PHQ QUESTIONS 1-9: 0

## 2020-02-21 ASSESSMENT — ENCOUNTER SYMPTOMS
ABDOMINAL DISTENTION: 0
COUGH: 0
DIARRHEA: 0
ABDOMINAL PAIN: 0
NAUSEA: 0
SHORTNESS OF BREATH: 0

## 2020-02-21 NOTE — PROGRESS NOTES
DATE OF VISIT : 2020    Patient : Dar Price   Sex : male   Age : 64 y.o.    : 1960   MRN : [id number]       Chief Complaint   Patient presents with    Hypertension     Present Illness : Patient here for follow-up on hypertension hypokalemia. Taking all his medications as prescribed. Denies any headaches, vision changes, chest pain, shortness of breath. Patient's vision is now back to being normal he states. Is following with neurology. Autoimmune disease work-up is pending. MRI and other scans were negative. Carotid Dopplers were not done. Patient denies any other intermittent vision changes. Taking his potassium tablets as well for history of hypokalemia. Patient also has a history of A. fib on apixaban. Patient states earlier this month he did feel himself go back in the A. fib. Denies any symptoms at this time. Past Medical History:   Diagnosis Date    Abdominal fibromatosis 2017    Atrial fibrillation (HCC)     Hyperlipidemia     Hypertension     Osteoarthritis     Sinusitis      Family History   Problem Relation Age of Onset    High Blood Pressure Mother     High Blood Pressure Father     No Known Problems Sister     No Known Problems Brother      Social History     Tobacco Use   Smoking Status Never Smoker   Smokeless Tobacco Never Used     Review of Systems :  Review of Systems   Constitutional: Negative for activity change, appetite change, chills, fatigue and fever. Respiratory: Negative for cough and shortness of breath. Cardiovascular: Negative for chest pain, palpitations and leg swelling. Gastrointestinal: Negative for abdominal distention, abdominal pain, diarrhea and nausea.      Physical Exam :    VITAL SIGNS :   Vitals:    20 1008   BP: 133/73   Pulse: 79   Resp: 16   Temp: 99 °F (37.2 °C)   TempSrc: Oral   SpO2: 98%   Weight: 191 lb (86.6 kg)   Height: 5' 8\" (1.727 m)     General Appearance: alert and oriented to person, place and time and in no acute distress  Pulmonary/Chest: clear to auscultation bilaterally- no wheezes, rales or rhonchi, normal air movement, no respiratory distress  Cardiovascular: Normal rate, irregularly irregular, normal S1-S2  Abdomen: soft, non-tender, non-distended, normal bowel sounds, no masses or organomegaly  Extremities: no cyanosis, no clubbing and no edema    Assessment & Plan :    Melchor Ordonez was seen today for hypertension. Diagnoses and all orders for this visit:    Decreased vision  -     VL DUP CAROTID BILATERAL; Future    Hypokalemia  -     potassium chloride (KLOR-CON M) 20 MEQ extended release tablet; TAKE 1 AND 1/2 TABLET BY MOUTH DAILY  -     hydroCHLOROthiazide (HYDRODIURIL) 25 MG tablet; TAKE ONE TABLET BY MOUTH EVERY DAY  -     BASIC METABOLIC PANEL; Future    Essential hypertension  -     verapamil (CALAN SR) 240 MG extended release tablet; Take 1 tablet by mouth nightly  -     hydroCHLOROthiazide (HYDRODIURIL) 25 MG tablet; TAKE ONE TABLET BY MOUTH EVERY DAY    Plan: Discussed with patient that he is back in A. fib. Advised him to follow-up with cardiology for possible ALBERTO/DCCV. Continue apixaban. BMP today to assess for his hypokalemia. Continue potassium supplementation. Blood pressure well controlled today. Continue verapamil and hydrochlorothiazide. Counseled regarding the possible side effects, risks, benefits and alternatives to treatment; patient and/or guardian verbalizes understanding, agrees, feels comfortable with and wishes to proceed with above treatment plan. Advised patient to call with any new medication issues, and read all Rx info from pharmacy to assure aware of all possible risks and side effects of medication before taking. Patient and/or guardian verbalizes understanding and agrees with above counseling, assessment and plan. All questions answered. Call or go to ED immediately if symptoms worsen or persist.  Return in about 2 months (around 4/21/2020). , or sooner if necessary  ----------------------------------------------------------------  Moira Goldsmith M.D.      Discussed with: Dr. Nitish Ponce

## 2020-02-24 LAB
ANTI-NUCLEAR ANTIBODY (ANA): NEGATIVE
ENA TO SSA (RO) ANTIBODY: NEGATIVE
ENA TO SSB (LA) ANTIBODY: NEGATIVE

## 2020-02-25 LAB
Lab: NORMAL
REPORT: NORMAL
THIS TEST SENT TO: NORMAL

## 2020-02-27 ENCOUNTER — TELEPHONE (OUTPATIENT)
Dept: CARDIOLOGY CLINIC | Age: 60
End: 2020-02-27

## 2020-02-28 NOTE — PROGRESS NOTES
[facility]  [facility]. [address] M.D. Kev Sheikh M.D.  Andrés Krause. [employer] Ignacia Brodie, M.D. Rina Pimentel M.D. MD Jovi Almaraz   1960  Elba Richey MD      This 16-year-old man is seen for outpatient cardiac follow-up today. He has a history of hypertension hyperlipidemia and left ventricular hypertrophy. He has a history of migraine headaches. He had ED evaluation for \"blurred vision\" October 2019. MRI was -10/31/2019 except for cortical atrophy and chronic periventricular micro-angiopathy. A carotid ultrasound is pending. He notes occasional palpitations. He is not having any chest pain or exertional dyspnea. The patient's atrial fibrillation was originally documented 10/19/2017. Anticoagulation was not recommended given low risk. It appears that a urgent care center physician 02/11/2018 initiated anticoagulation for stroke risk reduction unaware of cardiology opinion    Medical History:  1. Hypertension. 2. Stress myocardial perfusion, 12/2016. Normal perfusion and ejection fraction. 3. TTE, 10/2017  essentially normal except for mild concentric LVH. 4. Treadmill  EKG, 10/26/2017. Normal.  5. No history MI, CVA or CHF. 6. EKG 10/19/2017: Asymptomatic [id number] score of 1 not anticoagulated  7. Presented to [facility] on 06/26/2018 with mid-sternal \"throbbing/pulsating pain\" that started 0 6/24/2018 .  8. ECHO, 06/27/2018 (Dr. Julianna Gallardo). Normal left ventricular systolic function, EF 50%, Mild LVH, Mild TR, PASP is estimated at 16 mmHg, Small-moderate sized circumferential pericardial effusion (right ventricular inversion in early diastole). No definitive tamponade physiology based on TV/MV . 9. Labs, 06/26/2018. CRP 14.5. Troponin normal, d-dimer 765. 10. Echo, 02/04/2019. Normal EF. Normal diastolic function. No significant valvular abnormality. No chamber dilatation.     11. Lipid panel today's visit, medications include the following:    Current Outpatient Medications:     apixaban (ELIQUIS) 5 MG TABS tablet, Take 5 mg by mouth 2 times daily, Disp: , Rfl:     potassium chloride (KLOR-CON M) 20 MEQ extended release tablet, TAKE 1 AND 1/2 TABLET BY MOUTH DAILY, Disp: 100 tablet, Rfl: 1    verapamil (CALAN SR) 240 MG extended release tablet, Take 1 tablet by mouth nightly, Disp: 30 tablet, Rfl: 3    hydroCHLOROthiazide (HYDRODIURIL) 25 MG tablet, TAKE ONE TABLET BY MOUTH EVERY DAY, Disp: 30 tablet, Rfl: 3      Note: This report was completed utilizing computer voice recognition software. Every effort has been made to ensure accuracy, however; inadvertent computerized transcription errors may be present. Nahum Thomson.  Vanessa Irwin MD

## 2020-03-03 ENCOUNTER — OFFICE VISIT (OUTPATIENT)
Dept: CARDIOLOGY CLINIC | Age: 60
End: 2020-03-03
Payer: OTHER GOVERNMENT

## 2020-03-03 VITALS
HEART RATE: 73 BPM | BODY MASS INDEX: 29.8 KG/M2 | WEIGHT: 196.6 LBS | SYSTOLIC BLOOD PRESSURE: 118 MMHG | DIASTOLIC BLOOD PRESSURE: 64 MMHG | RESPIRATION RATE: 16 BRPM | HEIGHT: 68 IN

## 2020-03-03 PROCEDURE — 93000 ELECTROCARDIOGRAM COMPLETE: CPT | Performed by: INTERNAL MEDICINE

## 2020-03-03 PROCEDURE — 99213 OFFICE O/P EST LOW 20 MIN: CPT | Performed by: INTERNAL MEDICINE

## 2020-03-04 LAB
Lab: NORMAL
REPORT: NORMAL
THIS TEST SENT TO: NORMAL

## 2020-03-05 RX ORDER — ASPIRIN 325 MG
325 TABLET, DELAYED RELEASE (ENTERIC COATED) ORAL DAILY
Qty: 90 TABLET | Refills: 3 | Status: SHIPPED
Start: 2020-03-05 | End: 2020-09-15 | Stop reason: SDUPTHER

## 2020-03-19 ENCOUNTER — TELEPHONE (OUTPATIENT)
Dept: FAMILY MEDICINE CLINIC | Age: 60
End: 2020-03-19

## 2020-03-19 RX ORDER — IBUPROFEN 600 MG/1
600 TABLET ORAL 3 TIMES DAILY PRN
Qty: 90 TABLET | Refills: 0 | Status: SHIPPED
Start: 2020-03-19 | End: 2020-09-30

## 2020-03-27 RX ORDER — POTASSIUM CHLORIDE 20 MEQ/1
TABLET, EXTENDED RELEASE ORAL
Qty: 120 TABLET | Refills: 0 | Status: SHIPPED
Start: 2020-03-27 | End: 2020-04-29 | Stop reason: SDUPTHER

## 2020-04-29 RX ORDER — POTASSIUM CHLORIDE 20 MEQ/1
TABLET, EXTENDED RELEASE ORAL
Qty: 120 TABLET | Refills: 0 | Status: SHIPPED
Start: 2020-04-29 | End: 2020-04-29 | Stop reason: SDUPTHER

## 2020-04-29 RX ORDER — POTASSIUM CHLORIDE 20 MEQ/1
TABLET, EXTENDED RELEASE ORAL
Qty: 120 TABLET | Refills: 0 | Status: SHIPPED
Start: 2020-04-29 | End: 2020-09-15 | Stop reason: SDUPTHER

## 2020-04-29 RX ORDER — VERAPAMIL HYDROCHLORIDE 240 MG/1
240 TABLET, FILM COATED, EXTENDED RELEASE ORAL NIGHTLY
Qty: 30 TABLET | Refills: 3 | Status: SHIPPED
Start: 2020-04-29 | End: 2020-08-17

## 2020-05-11 ENCOUNTER — TELEPHONE (OUTPATIENT)
Dept: CARDIOLOGY CLINIC | Age: 60
End: 2020-05-11

## 2020-05-19 ENCOUNTER — HOSPITAL ENCOUNTER (OUTPATIENT)
Age: 60
Discharge: HOME | End: 2020-05-21
Payer: COMMERCIAL

## 2020-05-19 ENCOUNTER — OFFICE VISIT (OUTPATIENT)
Dept: CARDIOLOGY CLINIC | Age: 60
End: 2020-05-19
Payer: COMMERCIAL

## 2020-05-19 VITALS
RESPIRATION RATE: 16 BRPM | HEART RATE: 58 BPM | BODY MASS INDEX: 29.02 KG/M2 | WEIGHT: 191.5 LBS | DIASTOLIC BLOOD PRESSURE: 72 MMHG | SYSTOLIC BLOOD PRESSURE: 124 MMHG | HEIGHT: 68 IN

## 2020-05-19 LAB
CHOLESTEROL, TOTAL: 216 MG/DL (ref 0–199)
HDLC SERPL-MCNC: 61 MG/DL
LDL CHOLESTEROL CALCULATED: 145 MG/DL (ref 0–99)
TRIGL SERPL-MCNC: 52 MG/DL (ref 0–149)
VLDLC SERPL CALC-MCNC: 10 MG/DL

## 2020-05-19 PROCEDURE — 99214 OFFICE O/P EST MOD 30 MIN: CPT | Performed by: INTERNAL MEDICINE

## 2020-05-19 PROCEDURE — 80061 LIPID PANEL: CPT

## 2020-05-19 PROCEDURE — 93000 ELECTROCARDIOGRAM COMPLETE: CPT | Performed by: INTERNAL MEDICINE

## 2020-05-19 NOTE — PROGRESS NOTES
[facility]  Bryant Beasley. Marilyn Elizabeth M.D. [facility] Dana Ordonez M.D.  Vasile Frances. [employer], M.D. Meera Alcantar M.D. Maria Fernanda Richardson M.D. Martine Zamora MD                Rommel Pouch   1960  Genevieve Weeks MD             This 57-year-old man had outpatient cardiac follow-up today. He has a history of hypertension hyperlipidemia and left ventricular hypertrophy. He has a history of migraine headaches. He had ED evaluation for \"blurred vision\" October 2019. MRI was -10/31/2019 except for cortical atrophy and chronic periventricular micro-angiopathy. He notes occasional palpitations. He is not having any chest pain or exertional dyspnea.     Mr Bety Love atrial fibrillation was initially documented 10/19/2017  Anticoagulation was not recommended given low risk. It appears that a urgent care center physician 02/11/2018 initiated anticoagulation for stroke risk reduction unaware of cardiology opinion. He had outpatient cardiac follow-up 03/03/2020. Eliquis was stopped and he was prescribed Ecotrin 81 mg daily. At that time the patient declined referral to [address] He works part-time doing [employer] service. Recently has had several episodes of somewhat sharp left lower quadrant pain. He is also had some fleeting precordial pain. He denies exertional dyspnea sweats or palpitations.     Medical History:  1. Hypertension. 2. Stress myocardial perfusion, 12/2016. Normal perfusion and ejection fraction. 3. TTE, 10/2017  essentially normal except for mild concentric LVH. 4. Treadmill  EKG, 10/26/2017. Normal.  5. No history MI, CVA or CHF. 6. EKG 10/19/2017: Asymptomatic [id number] score of 1 not anticoagulated  7. Presented to [facility] on 06/26/2018 with mid-sternal \"throbbing/pulsating pain\" that started 0 6/24/2018 .  8. ECHO, 06/27/2018 (Dr. Jenelle Mendoza).   EF 65%, Mild LVH, Mild TR, PASP  estimated  16 mmHg, Small-moderate sized circumferential pericardial effusion (right ventricular inversion in early diastole). No definitive tamponade physiology based on TV/MV . 9. Labs, 06/26/2018. CRP 14.5. Troponin normal, d-dimer 765. 10. Echo, 02/04/2019. Normal EF. Normal diastolic function. No significant valvular abnormality. No chamber dilatation.   The patient has remained physically active. Lipid panel 05/2017: Total cholesterol 215, triglycerides 54, HDL 76 and    11. History HCTZ induced hypokalemia  12. Chest CT 02/11/2018: No PE. Minimal ASCVD calcification LAD. 13. Outpatient cardiac evaluation 03/03/2020. Rare palpitations  14. Outpatient cardiac evaluation 05/19/2020: Atypical chest pain. .       Review of Systems:  Constitutional: negative for fever and chills  Respiratory: negative for cough and hemoptysis  Cardiovascular:   Gastrointestinal: negative for abdominal pain, diarrhea, nausea and vomiting  Genitourinary:negative for dysuria and hematuria  Derm: negative for rash and skin lesion(s)  Neurological: negative for seizures and tremors  Endocrine: negative for diabetic symptoms including polydipsia and polyuria  Musculoskeletal: negative for CTD  Psychiatric: negative for psychosis and major depression    On examination, he is an alert pleasant middle-aged -American man in no distress   skin is warm and dry. Respirations are unlabored. /72   Pulse 58   Resp 16   Ht 5' 8\" (1.727 m)   Wt 191 lb 8 oz (86.9 kg)   BMI 29.12 kg/m² . HEENT negative for scleral icterus. Extraocular muscles intact. No facial asymmetry or central cyanosis. Neck without masses or goiter. No bruit or JVD. Cardiac apex not displaced. Rhythm regular. Abdomen normal.  Extremities without edema. EKG today shows sinus rhythm 58/min. WNL     The patient has coronary artery disease risk factors. Based on a chest CT demonstrating LAD calcification in 2018, he has anatomic evidence for ASCVD.   His 10-year risk will be assessed

## 2020-06-09 ENCOUNTER — TELEPHONE (OUTPATIENT)
Dept: CARDIOLOGY | Age: 60
End: 2020-06-09

## 2020-06-11 ENCOUNTER — TELEPHONE (OUTPATIENT)
Dept: CARDIOLOGY | Age: 60
End: 2020-06-11

## 2020-06-15 ENCOUNTER — HOSPITAL ENCOUNTER (OUTPATIENT)
Dept: CARDIOLOGY | Age: 60
Discharge: HOME | End: 2020-06-15
Payer: COMMERCIAL

## 2020-06-15 VITALS
SYSTOLIC BLOOD PRESSURE: 120 MMHG | BODY MASS INDEX: 28.95 KG/M2 | WEIGHT: 191 LBS | HEIGHT: 68 IN | HEART RATE: 82 BPM | DIASTOLIC BLOOD PRESSURE: 80 MMHG

## 2020-06-15 PROCEDURE — 93017 CV STRESS TEST TRACING ONLY: CPT

## 2020-06-17 ENCOUNTER — TELEPHONE (OUTPATIENT)
Dept: CARDIOLOGY CLINIC | Age: 60
End: 2020-06-17

## 2020-06-17 NOTE — TELEPHONE ENCOUNTER
Contacted patient with results and recommendations per Dr. Jonn Kuhn. Patient acknowledged understanding. Letter forwarded to PCP.    ----- Message from Alice Finn MD sent at 6/16/2020  5:02 PM EDT -----      [place] tell the patient that his treadmill test is fine. At this point, we do not think that his chest pain is cardiac related. However, he does have risk factors for a heart attack and these can be lowered by statin therapy as recommended above. He is recommended to have his family doctor follow-up. We can see him back in 1 year.

## 2020-08-17 RX ORDER — VERAPAMIL HYDROCHLORIDE 240 MG/1
240 TABLET, FILM COATED, EXTENDED RELEASE ORAL NIGHTLY
Qty: 30 TABLET | Refills: 0 | Status: SHIPPED
Start: 2020-08-17 | End: 2020-09-15 | Stop reason: SDUPTHER

## 2020-09-15 RX ORDER — HYDROCHLOROTHIAZIDE 25 MG/1
TABLET ORAL
Qty: 30 TABLET | Refills: 3 | Status: SHIPPED
Start: 2020-09-15 | End: 2020-12-17

## 2020-09-15 RX ORDER — VERAPAMIL HYDROCHLORIDE 240 MG/1
240 TABLET, FILM COATED, EXTENDED RELEASE ORAL NIGHTLY
Qty: 30 TABLET | Refills: 0 | Status: SHIPPED
Start: 2020-09-15 | End: 2020-09-30 | Stop reason: SDUPTHER

## 2020-09-15 RX ORDER — POTASSIUM CHLORIDE 20 MEQ/1
TABLET, EXTENDED RELEASE ORAL
Qty: 120 TABLET | Refills: 0 | Status: SHIPPED
Start: 2020-09-15 | End: 2020-09-30 | Stop reason: SDUPTHER

## 2020-09-15 RX ORDER — ASPIRIN 325 MG
325 TABLET, DELAYED RELEASE (ENTERIC COATED) ORAL DAILY
Qty: 90 TABLET | Refills: 3 | Status: SHIPPED
Start: 2020-09-15 | End: 2021-03-11 | Stop reason: SDUPTHER

## 2020-09-15 NOTE — TELEPHONE ENCOUNTER
Last Appointment:  Visit date not found  Future Appointments   Date Time Provider Weston Moran   9/30/2020  9:40 AM MD Josh Simons [facility]

## 2020-09-30 ENCOUNTER — HOSPITAL ENCOUNTER (OUTPATIENT)
Age: 60
Discharge: HOME | End: 2020-10-02
Payer: COMMERCIAL

## 2020-09-30 ENCOUNTER — OFFICE VISIT (OUTPATIENT)
Dept: FAMILY MEDICINE CLINIC | Age: 60
End: 2020-09-30
Payer: COMMERCIAL

## 2020-09-30 VITALS
WEIGHT: 192 LBS | SYSTOLIC BLOOD PRESSURE: 149 MMHG | BODY MASS INDEX: 30.13 KG/M2 | HEIGHT: 67 IN | RESPIRATION RATE: 12 BRPM | OXYGEN SATURATION: 98 % | DIASTOLIC BLOOD PRESSURE: 92 MMHG | TEMPERATURE: 98.7 F | HEART RATE: 72 BPM

## 2020-09-30 LAB
ALBUMIN SERPL-MCNC: 4.3 G/DL (ref 3.5–5.2)
ALP BLD-CCNC: 53 U/L (ref 40–129)
ALT SERPL-CCNC: 16 U/L (ref 0–40)
ANION GAP SERPL CALCULATED.3IONS-SCNC: 16 MMOL/L (ref 7–16)
AST SERPL-CCNC: 20 U/L (ref 0–39)
BASOPHILS ABSOLUTE: 0.03 E9/L (ref 0–0.2)
BASOPHILS RELATIVE PERCENT: 0.7 % (ref 0–2)
BILIRUB SERPL-MCNC: 0.7 MG/DL (ref 0–1.2)
BUN BLDV-MCNC: 16 MG/DL (ref 6–20)
CALCIUM SERPL-MCNC: 9.1 MG/DL (ref 8.6–10.2)
CHLORIDE BLD-SCNC: 99 MMOL/L (ref 98–107)
CHOLESTEROL, TOTAL: 182 MG/DL (ref 0–199)
CO2: 23 MMOL/L (ref 22–29)
CREAT SERPL-MCNC: 0.9 MG/DL (ref 0.7–1.2)
EOSINOPHILS ABSOLUTE: 0.2 E9/L (ref 0.05–0.5)
EOSINOPHILS RELATIVE PERCENT: 4.4 % (ref 0–6)
GFR AFRICAN AMERICAN: >60
GFR NON-AFRICAN AMERICAN: >60 ML/MIN/1.73
GLUCOSE BLD-MCNC: 97 MG/DL (ref 74–99)
HCT VFR BLD CALC: 40.5 % (ref 37–54)
HDLC SERPL-MCNC: 56 MG/DL
HEMOGLOBIN: 14.6 G/DL (ref 12.5–16.5)
IMMATURE GRANULOCYTES #: 0 E9/L
IMMATURE GRANULOCYTES %: 0 % (ref 0–5)
LDL CHOLESTEROL CALCULATED: 118 MG/DL (ref 0–99)
LYMPHOCYTES ABSOLUTE: 1.06 E9/L (ref 1.5–4)
LYMPHOCYTES RELATIVE PERCENT: 23.4 % (ref 20–42)
MAGNESIUM: 2.2 MG/DL (ref 1.6–2.6)
MCH RBC QN AUTO: 31.6 PG (ref 26–35)
MCHC RBC AUTO-ENTMCNC: 36 % (ref 32–34.5)
MCV RBC AUTO: 87.7 FL (ref 80–99.9)
MONOCYTES ABSOLUTE: 0.42 E9/L (ref 0.1–0.95)
MONOCYTES RELATIVE PERCENT: 9.3 % (ref 2–12)
NEUTROPHILS ABSOLUTE: 2.82 E9/L (ref 1.8–7.3)
NEUTROPHILS RELATIVE PERCENT: 62.2 % (ref 43–80)
PDW BLD-RTO: 12.3 FL (ref 11.5–15)
PLATELET # BLD: 168 E9/L (ref 130–450)
PMV BLD AUTO: 12.3 FL (ref 7–12)
POTASSIUM SERPL-SCNC: 4.3 MMOL/L (ref 3.5–5)
PROSTATE SPECIFIC ANTIGEN: 1.27 NG/ML (ref 0–4)
RBC # BLD: 4.62 E12/L (ref 3.8–5.8)
SODIUM BLD-SCNC: 138 MMOL/L (ref 132–146)
TOTAL PROTEIN: 7 G/DL (ref 6.4–8.3)
TRIGL SERPL-MCNC: 41 MG/DL (ref 0–149)
VLDLC SERPL CALC-MCNC: 8 MG/DL
WBC # BLD: 4.5 E9/L (ref 4.5–11.5)

## 2020-09-30 PROCEDURE — 99212 OFFICE O/P EST SF 10 MIN: CPT | Performed by: STUDENT IN AN ORGANIZED HEALTH CARE EDUCATION/TRAINING PROGRAM

## 2020-09-30 PROCEDURE — 36415 COLL VENOUS BLD VENIPUNCTURE: CPT

## 2020-09-30 PROCEDURE — 85025 COMPLETE CBC W/AUTO DIFF WBC: CPT

## 2020-09-30 PROCEDURE — 36415 COLL VENOUS BLD VENIPUNCTURE: CPT | Performed by: FAMILY MEDICINE

## 2020-09-30 PROCEDURE — G0103 PSA SCREENING: HCPCS

## 2020-09-30 PROCEDURE — 80053 COMPREHEN METABOLIC PANEL: CPT

## 2020-09-30 PROCEDURE — 80061 LIPID PANEL: CPT

## 2020-09-30 PROCEDURE — 99213 OFFICE O/P EST LOW 20 MIN: CPT | Performed by: STUDENT IN AN ORGANIZED HEALTH CARE EDUCATION/TRAINING PROGRAM

## 2020-09-30 PROCEDURE — 83735 ASSAY OF MAGNESIUM: CPT

## 2020-09-30 RX ORDER — POTASSIUM CHLORIDE 20 MEQ/1
TABLET, EXTENDED RELEASE ORAL
Qty: 120 TABLET | Refills: 0 | Status: SHIPPED
Start: 2020-09-30 | End: 2020-12-28 | Stop reason: ALTCHOICE

## 2020-09-30 RX ORDER — VERAPAMIL HYDROCHLORIDE 240 MG/1
240 TABLET, FILM COATED, EXTENDED RELEASE ORAL NIGHTLY
Qty: 30 TABLET | Refills: 0 | Status: SHIPPED
Start: 2020-09-30 | End: 2020-11-25 | Stop reason: SDUPTHER

## 2020-09-30 RX ORDER — ATORVASTATIN CALCIUM 40 MG/1
20 TABLET, FILM COATED ORAL DAILY
Qty: 30 TABLET | Refills: 2 | Status: SHIPPED
Start: 2020-09-30 | End: 2020-12-28

## 2020-09-30 ASSESSMENT — ENCOUNTER SYMPTOMS
BLOOD IN STOOL: 0
COUGH: 0
DIARRHEA: 0
ABDOMINAL PAIN: 0
SHORTNESS OF BREATH: 0
VOMITING: 0
COLOR CHANGE: 0
WHEEZING: 0
CHEST TIGHTNESS: 1

## 2020-09-30 NOTE — PROGRESS NOTES
S: 64 y.o. male for follow up a fib, HTN. Has seen cardiology Dr. Jensen Thapa. Had atypical chest pain evaluated at that time. Started on Eliquis initially, but allergic, but now on ASA full strength. Feels occasional palpitations, once or twice per day, improved overall compared to previous. Sometimes has a slight discomfort/squeezing with this. Has not had any chest pain or pressure in the past month at least.    O: VS: BP (!) 149/92 (Site: Left Upper Arm, Position: Sitting, Cuff Size: Medium Adult)   Pulse 72   Temp 98.7 °F (37.1 °C) (Oral)   Resp 12   Ht 5' 7\" (1.702 m)   Wt 192 lb (87.1 kg)   SpO2 98%   BMI 30.07 kg/m²    General: NAD, appropriate affect and grooming   CV:  RRR, no gallops, rubs, or murmurs   Resp: CTAB   Abd:  Soft, nontender   Ext:  No edema   Neuro:  No deficits noted   Impression: A fib, HTN  Plan: Referred to discuss symptoms of palpitations and further evaluation and management with cardiology; considerations for EP. Labs, watch electrolytes and follow potassium. Recheck lipids, consider statin. BP not quite at goal today, but has been controlled in the past.  Has been adherent to medications. Encouraged lower sodium diet, weight reduction, walking as able. Follow closely in 4-6 weeks or sooner prn. BP cuff for home, track readings at home and call us with readings. Attending Physician Statement  I have discussed the case, including pertinent history and exam findings with the resident. I agree with the documented assessment and plan.

## 2020-09-30 NOTE — PROGRESS NOTES
[address] [facility] RESIDENCY PROGRAM  DATE OF VISIT : 2020    Patient : Christiano Christie   Age : 64 y.o.    : 1960   MRN : [id number]     Chief Complaint :   Chief Complaint   Patient presents with    Check-Up       HPI : Christiano Christie is 64 y.o. male who with PMHx of HTN, left ventricular hypertrophy, migraine and aFIB presented to the clinic today for follow up. Afib: Patient reports intermittent palpitations. He says symptoms are improving compared to previous but still feel occasional  palpitations and flutter, 1-2 times in day. Patient does follow with cardiologist. Denies any diaphoresis, sob, dyspnea on exertion or syncope. Last episode of chest pain was 3 weeks back. HTN: reports BP has been elevated for past few months. Denies weakness, epistaxis, headaches, blurry vision or abdominal pain. Past Medical History :  Past Medical History:   Diagnosis Date    Abdominal fibromatosis 2017    Atrial fibrillation ([address]     Hyperlipidemia     Hypertension     Osteoarthritis     knees    Sinusitis      Past Surgical History:   Procedure Laterality Date    COLONOSCOPY  2012    KNEE ARTHROSCOPY Bilateral ,     left knee, right knee       Allergies :   No Known Allergies    Medication List :    Current Outpatient Medications   Medication Sig Dispense Refill    verapamil (CALAN SR) 240 MG extended release tablet Take 1 tablet by mouth nightly 30 tablet 0    potassium chloride (KLOR-CON M) 20 MEQ extended release tablet Take 1.5 tablets  daily 120 tablet 0    hydroCHLOROthiazide (HYDRODIURIL) 25 MG tablet TAKE ONE TABLET BY MOUTH EVERY DAY 30 tablet 3    aspirin 325 MG EC tablet Take 1 tablet by mouth daily 90 tablet 3     No current facility-administered medications for this visit. Review of Systems :  Review of Systems   Constitutional: Negative for activity change, diaphoresis and fatigue. HENT: Negative for congestion.     Eyes: Negative for visual disturbance. Respiratory: Positive for chest tightness. Negative for cough, shortness of breath and wheezing. Cardiovascular: Positive for chest pain and palpitations. Negative for leg swelling. Gastrointestinal: Negative for abdominal pain, blood in stool, diarrhea and vomiting. Genitourinary: Negative for dysuria. Musculoskeletal: Negative for arthralgias. Skin: Negative for color change. Neurological: Negative for dizziness, speech difficulty, weakness, light-headedness and numbness. Hematological: Negative for adenopathy. Psychiatric/Behavioral: Negative for agitation. The patient is not nervous/anxious and is not hyperactive. Physical Exam :  Vitals: BP (!) 149/92 (Site: Left Upper Arm, Position: Sitting, Cuff Size: Medium Adult)   Pulse 72   Temp 98.7 °F (37.1 °C) (Oral)   Resp 12   Ht 5' 7\" (1.702 m)   Wt 192 lb (87.1 kg)   SpO2 98%   BMI 30.07 kg/m²     General Appearance: Well developed, awake, alert, oriented, and not in acute distress  HEENT: NCAT, MMM, no pallor or icterus. Neck: Supple, symmetrical, trachea midline. No JVD or carotid bruits. Chest wall/Lung: Clear to ausculation bilaterally, respirations unlabored. Heart[de-identified] Regular, rate and rhythm , normal S1 and S2, no murmurs, rubs or gallops. Abdomen: Soft, non tender, not distended, +BS  Extremities: Extremities normal, atraumatic, no cyanosis, clubbing or edema. Neurologic: Alert&Oriented x3. Moves all 4 limbs. Sensation grossly intact. Psychiatric: has a normal mood and affect. Behavior is normal.     Assessment & Plan : Mr. Mireya Robb seen today for HTN and Palpitaions.        Atrial fibrillation, unspecified type (HCC)  Palpitations  - patient endorses improvement in symptoms but still feel occasional palpitations  - was on blood thinner before but discontinued due to low risk  - currently on Asprin, Verapamil and statin  - does follow with Dr. Law Both  - advised patient to make an appointment with his cardiologist and have discuss symptoms, further management and consideration for EP evaluation   - may benefit from event monitor     Essential hypertension  - today BP is elevated but has been controlled in the past  - asymptomatic  - continue current regimen   - reevaluate BP in 4 weeks  - CBC WITH AUTO DIFFERENTIAL; Future  - COMPREHENSIVE METABOLIC PANEL; Future  - LIPID PANEL; Future  - low salt diet, weight loss and regular exercise   - monitor BP at home     Hypokalemia  - last K was 3.9  - patient is on potassium supplementation   - potassium chloride (KLOR-CON M) 20 MEQ extended release tablet; Take 1.5 tablets  daily  Dispense: 120 tablet;  Refill: 0  - ordered BMP and mag    Ordered labs CBC, CMP, Lipid panel and PSA screening as per patient request .      F/UP in 6-8 weeks   Jasbir Jama MD

## 2020-11-25 RX ORDER — VERAPAMIL HYDROCHLORIDE 240 MG/1
240 TABLET, FILM COATED, EXTENDED RELEASE ORAL NIGHTLY
Qty: 30 TABLET | Refills: 0 | Status: SHIPPED
Start: 2020-11-25 | End: 2020-12-28 | Stop reason: SDUPTHER

## 2020-12-17 RX ORDER — HYDROCHLOROTHIAZIDE 25 MG/1
TABLET ORAL
Qty: 30 TABLET | Refills: 0 | Status: SHIPPED
Start: 2020-12-17 | End: 2020-12-28 | Stop reason: SDUPTHER

## 2020-12-28 ENCOUNTER — OFFICE VISIT (OUTPATIENT)
Dept: FAMILY MEDICINE CLINIC | Age: 60
End: 2020-12-28
Payer: COMMERCIAL

## 2020-12-28 VITALS
WEIGHT: 187 LBS | RESPIRATION RATE: 16 BRPM | TEMPERATURE: 97.7 F | DIASTOLIC BLOOD PRESSURE: 89 MMHG | HEIGHT: 67 IN | SYSTOLIC BLOOD PRESSURE: 161 MMHG | HEART RATE: 80 BPM | BODY MASS INDEX: 29.35 KG/M2 | OXYGEN SATURATION: 99 %

## 2020-12-28 PROCEDURE — 99212 OFFICE O/P EST SF 10 MIN: CPT | Performed by: STUDENT IN AN ORGANIZED HEALTH CARE EDUCATION/TRAINING PROGRAM

## 2020-12-28 PROCEDURE — 99213 OFFICE O/P EST LOW 20 MIN: CPT | Performed by: STUDENT IN AN ORGANIZED HEALTH CARE EDUCATION/TRAINING PROGRAM

## 2020-12-28 RX ORDER — POTASSIUM CHLORIDE 20 MEQ/1
TABLET, EXTENDED RELEASE ORAL
Qty: 120 TABLET | Refills: 0 | Status: SHIPPED
Start: 2020-12-28 | End: 2021-03-11 | Stop reason: SDUPTHER

## 2020-12-28 RX ORDER — POTASSIUM CHLORIDE 20 MEQ/1
TABLET, EXTENDED RELEASE ORAL
Qty: 120 TABLET | Refills: 0 | Status: CANCELLED | OUTPATIENT
Start: 2020-12-28

## 2020-12-28 RX ORDER — HYDROCHLOROTHIAZIDE 25 MG/1
25 TABLET ORAL DAILY
Qty: 30 TABLET | Refills: 3 | Status: SHIPPED
Start: 2020-12-28 | End: 2021-03-11 | Stop reason: SDUPTHER

## 2020-12-28 RX ORDER — VERAPAMIL HYDROCHLORIDE 240 MG/1
240 TABLET, FILM COATED, EXTENDED RELEASE ORAL DAILY
Qty: 90 TABLET | Refills: 1 | Status: SHIPPED
Start: 2020-12-28 | End: 2021-02-01

## 2020-12-28 RX ORDER — LISINOPRIL 10 MG/1
10 TABLET ORAL DAILY
Qty: 90 TABLET | Refills: 0 | Status: SHIPPED
Start: 2020-12-28 | End: 2021-03-03

## 2020-12-28 ASSESSMENT — ENCOUNTER SYMPTOMS
SHORTNESS OF BREATH: 0
VOMITING: 0
SORE THROAT: 0
COLOR CHANGE: 0
ABDOMINAL PAIN: 0
COUGH: 0
NAUSEA: 0

## 2020-12-28 NOTE — PROGRESS NOTES
[facility]  FAMILY MEDICINE RESIDENCY PROGRAM  DATE OF VISIT : 2020    Patient : Aimee Richmond   Age : 64 y.o.    : 1960   MRN : [id number]     Chief Complaint :   Chief Complaint   Patient presents with    Hypertension     Elevated christina today     Health Maintenance     declines flu vaccine to day        HPI : Aimee Richmond is 64 y.o. male with past medical history of hypertension, TERRY loera who presented to the clinic today for blood pressure follow-up and lab result. Hypertension: Patient blood pressure is elevated today. Patient is asymptomatic. He is on verapamil 240 mg daily and hydrochlorothiazide 25 mg daily. Stent was seen 4 weeks ago and his blood pressure was found to be elevated. Denies any chest pain, headache, blurry vision, leg swelling, abdominal pain, nausea, vomiting or leg swelling    TERRY loera: Stable and does follow with Dr. Thanh Howard, cardiologist.  Patient stays he is going to schedule appointment with him in 2021. Patient currently on high-dose aspirin.        Past Medical History :  Past Medical History:   Diagnosis Date    Abdominal fibromatosis 2017    Atrial fibrillation (HCC)     Hyperlipidemia     Hypertension     Osteoarthritis     knees    Sinusitis      Past Surgical History:   Procedure Laterality Date    COLONOSCOPY  2012    KNEE ARTHROSCOPY Bilateral , 2010    left knee, right knee       Allergies :   No Known Allergies    Medication List :    Current Outpatient Medications   Medication Sig Dispense Refill    hydroCHLOROthiazide (HYDRODIURIL) 25 MG tablet Take 1 tablet by mouth daily 30 tablet 3    verapamil (CALAN SR) 240 MG extended release tablet Take 1 tablet by mouth daily 90 tablet 1    potassium chloride (KLOR-CON M) 20 MEQ extended release tablet Take 1.5 tablets  daily 120 tablet 0    lisinopril (PRINIVIL;ZESTRIL) 10 MG tablet Take 1 tablet by mouth daily 90 tablet 0    aspirin 325 MG EC tablet Take 1 tablet by mouth daily 90 tablet 3     No current facility-administered medications for this visit. Review of Systems :  Review of Systems   Constitutional: Negative for activity change and fatigue. HENT: Negative for congestion and sore throat. Eyes: Negative for visual disturbance. Respiratory: Negative for cough and shortness of breath. Cardiovascular: Negative for chest pain, palpitations and leg swelling. Gastrointestinal: Negative for abdominal pain, nausea and vomiting. Genitourinary: Negative for difficulty urinating. Musculoskeletal: Negative for arthralgias. Skin: Negative for color change and pallor. Neurological: Negative for dizziness, seizures, weakness, numbness and headaches. Hematological: Negative for adenopathy. Psychiatric/Behavioral: Negative for agitation. Physical Exam :    Vitals: BP (!) 161/89   Pulse 80   Temp 97.7 °F (36.5 °C) (Temporal)   Resp 16   Ht 5' 7\" (1.702 m)   Wt 187 lb (84.8 kg)   SpO2 99%   BMI 29.29 kg/m²     General Appearance: Well developed, awake, alert, oriented, and not in acute distress  HEENT: NCAT, MMM, no pallor or icterus. Neck: Supple, symmetrical, trachea midline. Chest wall/Lung: CTAB, respirations unlabored. Heart[de-identified] Regular rate and rhythm, normal S1 and S2, no murmurs, rubs or gallops. Abdomen: Soft, nontender, nondistended  Extremities: Extremities normal, atraumatic, no cyanosis, clubbing or edema. Skin: Skin color, texture, turgor normal, no rashes or lesions  Neurologic: Alert&Oriented x3. Motor and sensation grossly intact. Psychiatric: has a normal mood and affect. Behavior is normal.     Assessment & Plan : Mr. Edilia Castro seen today for elevated blood pressure    Reviewed lab results and notified patient. 1. Essential hypertension  -Uncontrolled  -Has been elevated for past few months  -Patient is asymptomatic  -We will add lisinopril 10 mg daily.   Discussed about side effect of medication  -Repeat BMP in 1

## 2020-12-28 NOTE — PROGRESS NOTES
S: 64 y.o. male presents today for Hypertension (Elevated christina today ) and Health Maintenance (declines flu vaccine to day )    HTN f/u:  No recent med changes  BP elevation today as well  Adherent with his medications    HLD:  Not on lipitor due to concern for se. Atrial fib: Follows with Dr. Gracia Simmonds  Currently not on [facility] except high dose ASA    O: VS: BP (!) 161/89   Pulse 80   Temp 97.7 °F (36.5 °C) (Temporal)   Resp 16   Ht 5' 7\" (1.702 m)   Wt 187 lb (84.8 kg)   SpO2 99%   BMI 29.29 kg/m²   AAO/NAD, appropriate affect for mood  ENT: wnl  CV:  RRR, no murmur  Resp: CTAB  Abdomen: SNTND  Ext: No edema      Assessment/Plan:   1) HTN, uncontrolled - start lisinopril 10mg for now; nursing visit 2 weeks  2) Atrial fib - continue verapamil and ASA; per cards  3) HLD / ASCVD 17% - currently not on statin per pt pref; continue with lifestyle modifications for now  HM - as ordered  RTO: 2 months    Attending Physician Statement  I have discussed the case, including pertinent history and exam findings with the resident. I agree with the documented assessment and plan.       Electronically signed by Gege Richard MD on 12/28/2020 at 12:11 PM

## 2021-01-30 DIAGNOSIS — I10 ESSENTIAL HYPERTENSION: ICD-10-CM

## 2021-02-01 RX ORDER — VERAPAMIL HYDROCHLORIDE 240 MG/1
240 TABLET, FILM COATED, EXTENDED RELEASE ORAL DAILY
Qty: 30 TABLET | Refills: 0 | Status: SHIPPED
Start: 2021-02-01 | End: 2021-03-11 | Stop reason: SDUPTHER

## 2021-03-02 NOTE — PROGRESS NOTES
[facility]  Gunjan Celeste. Guru Rosen M.D. Yaya Scott M.D.  Eric Figueredo. [facility], M.D. Power Snow M.D. Eligio Whalen M.D. MD Rufina Grewal   1960  Francesco Maravilla MD      This 72-year-old man is seen today for outpatient cardiac follow-up. He has a history of hypertension hyperlipidemia and left ventricular hypertrophy.  He has a history of migraine headaches. Diego Velazquez had ED evaluation for \"blurred vision\" October 2019. Jaylon Marquez was -10/31/2019 except for cortical atrophy and chronic periventricular micro-angiopathy     Atrial fibrillation was initially documented 10/19/2017  Anticoagulation was not recommended given low risk.  It appears that a urgent care center physician 02/11/2018 initiated anticoagulation for stroke risk reduction unaware of cardiology opinion. He had outpatient cardiac follow-up 03/03/2020. Eliquis was stopped and he was prescribed Ecotrin 81 mg daily. At that time the patient declined referral to [facility]     Today he states he overall feels well. He does not have palpitations although he occasionally feels \"lightheaded\". He has not had syncope or presyncope. He denies chest pain or shortness of breath. The patient has been noncompliant with recommendations for a statin as well as lisinopril. He is concerned regarding the potential side effect of the medication      Medical history:  1. Hypertension. 2. Stress myocardial perfusion, 12/2016. Normal perfusion and ejection fraction. 3. TTE, 10/2017  essentially normal except for mild concentric LVH. 4. Treadmill  EKG, 10/26/2017. Normal.  5. No history MI, CVA or CHF. 6. EKG 10/19/2017: Asymptomatic [id number] score of 1 not anticoagulated  7. Presented to [facility] on 06/26/2018 with mid-sternal \"throbbing/pulsating pain\" that started 0 6/24/2018 .  8. ECHO, 06/27/2018 (Dr. Alexey Marcos).   EF 65%, Mild LVH, Mild TR, PASP  estimated  16 mmHg, Small-moderate for scleral icterus. Extraocular muscles intact. No facial asymmetry or central cyanosis. Neck without masses or goiter. No bruit or JVD. Cardiac apex not displaced. Rhythm irregular 60/min abdomen normal.  Extremities without edema. Lungs are clear    EKG today per Dr. Maryellen Marcos: Atrial fibrillation with ventricular response 72/min. Narrow QRS complex. Except for rhythm EKG normal    The patient has paroxysmal atrial fibrillation with minimal symptoms. Frequency and duration of the arrhythmia is unclear but the basis is likely hypertensive disease. He eventually may become a chronic fibrillator. An opinion from Phan Perez will be obtained. The echo will be updated. He currently is not a candidate to benefit from chronic anticoagulation. There was a significant amount of time explaining these issues and the reason for referral (30 minutes)    At completion of today's visit, medications include the following:    Current Outpatient Medications:     verapamil (CALAN SR) 240 MG extended release tablet, Take 1 tablet by mouth daily, Disp: 30 tablet, Rfl: 0    hydroCHLOROthiazide (HYDRODIURIL) 25 MG tablet, Take 1 tablet by mouth daily, Disp: 30 tablet, Rfl: 3    potassium chloride (KLOR-CON M) 20 MEQ extended release tablet, Take 1.5 tablets  daily, Disp: 120 tablet, Rfl: 0    aspirin 325 MG EC tablet, Take 1 tablet by mouth daily, Disp: 90 tablet, Rfl: 3    lisinopril (PRINIVIL;ZESTRIL) 10 MG tablet, Take 1 tablet by mouth daily (Patient not taking: Reported on 3/3/2021), Disp: 90 tablet, Rfl: 0      Note: This report was completed utilizing computer voice recognition software. Every effort has been made to ensure accuracy, however; inadvertent computerized transcription errors may be present.     --Osmani Christie MD on 3/3/2021 at 8:22 AM

## 2021-03-03 ENCOUNTER — OFFICE VISIT (OUTPATIENT)
Dept: CARDIOLOGY CLINIC | Age: 61
End: 2021-03-03
Payer: COMMERCIAL

## 2021-03-03 VITALS
HEART RATE: 72 BPM | BODY MASS INDEX: 30.15 KG/M2 | HEIGHT: 67 IN | SYSTOLIC BLOOD PRESSURE: 134 MMHG | WEIGHT: 192.1 LBS | RESPIRATION RATE: 16 BRPM | DIASTOLIC BLOOD PRESSURE: 88 MMHG

## 2021-03-03 DIAGNOSIS — E78.5 DYSLIPIDEMIA: ICD-10-CM

## 2021-03-03 DIAGNOSIS — I48.0 PAF (PAROXYSMAL ATRIAL FIBRILLATION) (HCC): ICD-10-CM

## 2021-03-03 DIAGNOSIS — I10 ESSENTIAL HYPERTENSION: Primary | ICD-10-CM

## 2021-03-03 DIAGNOSIS — R06.02 SHORTNESS OF BREATH: ICD-10-CM

## 2021-03-03 LAB
CHOLESTEROL, TOTAL: 203 MG/DL (ref 0–199)
HDLC SERPL-MCNC: 48 MG/DL
LDL CHOLESTEROL CALCULATED: 146 MG/DL (ref 0–99)
TRIGL SERPL-MCNC: 46 MG/DL (ref 0–149)
VLDLC SERPL CALC-MCNC: 9 MG/DL

## 2021-03-03 PROCEDURE — 93000 ELECTROCARDIOGRAM COMPLETE: CPT | Performed by: INTERNAL MEDICINE

## 2021-03-03 PROCEDURE — 99213 OFFICE O/P EST LOW 20 MIN: CPT | Performed by: INTERNAL MEDICINE

## 2021-03-03 NOTE — PROGRESS NOTES
Patient had blood work drawn in office. Patient had no issues and tolerated it well. Left Arm. Yousuf Dexter MA.

## 2021-03-09 ENCOUNTER — OFFICE VISIT (OUTPATIENT)
Dept: NON INVASIVE DIAGNOSTICS | Age: 61
End: 2021-03-09
Payer: COMMERCIAL

## 2021-03-09 VITALS
BODY MASS INDEX: 30.32 KG/M2 | OXYGEN SATURATION: 97 % | RESPIRATION RATE: 16 BRPM | SYSTOLIC BLOOD PRESSURE: 142 MMHG | WEIGHT: 193.2 LBS | HEIGHT: 67 IN | DIASTOLIC BLOOD PRESSURE: 98 MMHG | HEART RATE: 57 BPM

## 2021-03-09 DIAGNOSIS — I48.91 ATRIAL FIBRILLATION, UNSPECIFIED TYPE (HCC): Primary | ICD-10-CM

## 2021-03-09 PROCEDURE — 93000 ELECTROCARDIOGRAM COMPLETE: CPT | Performed by: SPECIALIST

## 2021-03-09 PROCEDURE — 99205 OFFICE O/P NEW HI 60 MIN: CPT | Performed by: SPECIALIST

## 2021-03-09 NOTE — LETTER
Rosalva Sergio paid his first visit to the [facility]) Cardiac Electrophysiology office on 3/9/21. SYNOPSIS  1. Poor metabolic health incurring multiple comorbid conditions. 2. Atrial fibrillation: paroxysmal. High (accelerating) burden. Symptomatic. 3. Preserved cardiac mechanical function. 4. CHADS-VASC 1-2 (HTN, subclinical coronary atherosclerosis): compliant with and tolerant of aspirin 325 QD. RECOMMENDATIONS  1. I gave him options towards atrial rhythm control, including type I/III antiarrhythmic drug and catheter ablation. He will let me know if he is interested in either of these. 2. He is aware of the pros and cons of warfarin/DOAC therapy, and at this time has made an informed decision to continue with current ASA regimen. If we do proceed, I will keep you posted.     Kush Fall MD, [facility]

## 2021-03-11 ENCOUNTER — OFFICE VISIT (OUTPATIENT)
Dept: FAMILY MEDICINE CLINIC | Age: 61
End: 2021-03-11
Payer: COMMERCIAL

## 2021-03-11 VITALS
OXYGEN SATURATION: 97 % | TEMPERATURE: 97.5 F | HEART RATE: 69 BPM | RESPIRATION RATE: 16 BRPM | WEIGHT: 191 LBS | DIASTOLIC BLOOD PRESSURE: 84 MMHG | SYSTOLIC BLOOD PRESSURE: 150 MMHG | BODY MASS INDEX: 29.98 KG/M2 | HEIGHT: 67 IN

## 2021-03-11 DIAGNOSIS — I10 ESSENTIAL HYPERTENSION: ICD-10-CM

## 2021-03-11 DIAGNOSIS — E87.6 HYPOKALEMIA: ICD-10-CM

## 2021-03-11 PROCEDURE — 99213 OFFICE O/P EST LOW 20 MIN: CPT | Performed by: STUDENT IN AN ORGANIZED HEALTH CARE EDUCATION/TRAINING PROGRAM

## 2021-03-11 PROCEDURE — 99212 OFFICE O/P EST SF 10 MIN: CPT | Performed by: STUDENT IN AN ORGANIZED HEALTH CARE EDUCATION/TRAINING PROGRAM

## 2021-03-11 RX ORDER — HYDROCHLOROTHIAZIDE 25 MG/1
25 TABLET ORAL DAILY
Qty: 30 TABLET | Refills: 3 | Status: SHIPPED
Start: 2021-03-11 | End: 2021-05-17 | Stop reason: SDUPTHER

## 2021-03-11 RX ORDER — LISINOPRIL 10 MG/1
10 TABLET ORAL DAILY
Qty: 90 TABLET | Refills: 1 | Status: SHIPPED
Start: 2021-03-11 | End: 2021-03-11 | Stop reason: SDDI

## 2021-03-11 RX ORDER — VERAPAMIL HYDROCHLORIDE 240 MG/1
240 TABLET, FILM COATED, EXTENDED RELEASE ORAL DAILY
Qty: 30 TABLET | Refills: 0 | Status: SHIPPED
Start: 2021-03-11 | End: 2021-05-17 | Stop reason: SDUPTHER

## 2021-03-11 RX ORDER — ASPIRIN 325 MG
325 TABLET, DELAYED RELEASE (ENTERIC COATED) ORAL DAILY
Qty: 90 TABLET | Refills: 3 | Status: SHIPPED
Start: 2021-03-11 | End: 2021-10-19 | Stop reason: SDUPTHER

## 2021-03-11 RX ORDER — POTASSIUM CHLORIDE 20 MEQ/1
TABLET, EXTENDED RELEASE ORAL
Qty: 120 TABLET | Refills: 0 | Status: SHIPPED
Start: 2021-03-11 | End: 2021-05-17 | Stop reason: SDUPTHER

## 2021-03-11 NOTE — PROGRESS NOTES
[facility] [facility] RESIDENCY PROGRAM  DATE OF VISIT : 3/12/2021    Patient : William Delgadillo   Age : 63 y.o.  : 1960   MRN : [id number]     Chief Complaint :   Chief Complaint   Patient presents with    Hypertension     f/u       HPI : William Delgadillo is 63 y.o. male with past medical history of hypertension, A. fib and chronic hypokalemia who presented to the clinic today for blood pressure follow-up. Hypertension  Patient is here for follow-up of elevated blood pressure. Patient blood pressure has been persistently elevated for last 2 to 3 months. We added lisinopril 10 mg on last visit but patient refused to take it because he is afraid of side effects. He is exercising and is adherent to a low-salt diet. Blood pressure is not well controlled at home. Cardiac symptoms: none. Patient denies chest pressure/discomfort, dyspnea, exertional chest pressure/discomfort, fatigue, irregular heart beat, lower extremity edema, orthopnea, palpitations, paroxysmal nocturnal dyspnea and tachypnea. Cardiovascular risk factors: advanced age (older than 54 for men, 72 for women), dyslipidemia, hypertension and male gender. A. fib: Controlled with medication. Does follow with cardiologist and recently seen by EP. Patient is on high-dose aspirin. His [id number] score is 1-2(HTN, Subclinical coronary atherosclerosis).      Past Medical History :  Past Medical History:   Diagnosis Date    Abdominal fibromatosis 2017    Atrial fibrillation (HCC)     Hyperlipidemia     Hypertension     Osteoarthritis     knees    Sinusitis      Past Surgical History:   Procedure Laterality Date    COLONOSCOPY  2012    KNEE ARTHROSCOPY Bilateral , 2010    left knee, right knee       Allergies :   No Known Allergies    Medication List :    Current Outpatient Medications   Medication Sig Dispense Refill    verapamil (CALAN SR) 240 MG extended release tablet Take 1 tablet by mouth daily 30 tablet 0    hydroCHLOROthiazide (HYDRODIURIL) 25 MG tablet Take 1 tablet by mouth daily 30 tablet 3    potassium chloride (KLOR-CON M) 20 MEQ extended release tablet Take 1.5 tablets  daily 120 tablet 0    aspirin 325 MG EC tablet Take 1 tablet by mouth daily 90 tablet 3     No current facility-administered medications for this visit. Review of Systems :  Review of Systems   Constitutional: Negative for activity change, diaphoresis and fatigue. HENT: Negative for congestion, postnasal drip, rhinorrhea and voice change. Eyes: Negative for visual disturbance. Respiratory: Negative for cough and shortness of breath. Cardiovascular: Negative for chest pain, palpitations and leg swelling. Gastrointestinal: Negative for abdominal pain, diarrhea, nausea and vomiting. Genitourinary: Negative for difficulty urinating. Musculoskeletal: Negative for arthralgias. Skin: Negative for color change. Neurological: Negative for dizziness, facial asymmetry, speech difficulty, weakness, numbness and headaches. Hematological: Does not bruise/bleed easily. Psychiatric/Behavioral: Negative for agitation. Physical Exam :    Vitals: BP (!) 150/84 (Site: Right Upper Arm, Position: Sitting, Cuff Size: Medium Adult)   Pulse 69   Temp 97.5 °F (36.4 °C) (Cerebral)   Resp 16   Ht 5' 7\" (1.702 m)   Wt 191 lb (86.6 kg)   SpO2 97%   BMI 29.91 kg/m²     General Appearance: Well developed, awake, alert, oriented, and not in acute distress  HEENT: NCAT, MMM, no pallor or icterus. Neck: Supple, symmetrical, no carotid bruits. Chest wall/Lung: Clear to auscultation bilaterally, respirations unlabored. No ronchi/wheezing/rales  Heart[de-identified] RRR, normal S1 and S2  Abdomen: Soft, nontender, nondistended  Extremities: Extremities normal, atraumatic, no cyanosis, clubbing or edema. Skin: Skin color, texture, turgor normal, no rashes or lesions  Neurologic: Alert&Oriented x3. Motor and Sensation grossly intact. Psychiatric: has a normal mood and affect. Behavior is normal.     Assessment & Plan :    1. Essential hypertension  -Not well controlled but improving  -Patient presented with blood pressure logs which shows blood pressure are ranging between [id number] systolic and [id number] diastolic  - hydroCHLOROthiazide (HYDRODIURIL) 25 MG tablet; Take 1 tablet by mouth daily  Dispense: 30 tablet; Refill: 3  -Lisinopril 10 mg was added on last visit but patient refused to take because he is afraid of side effects. Despite of detailed counseling he choosed to be on lisinopril  -Monitor blood pressure at home and keep logs  -If persistently high plan to add alternative like beta-blocker  -Patient ASCVD score is high(17.5%). Discussed about mortality  benefit of being on statin. Patient is willing to continue exercise and diet control. Does not want to be on Lipitor    2. Hypokalemia  -Recent potassium is normal  -Asymptomatic  -Continue potassium chloride (KLOR-CON M) 20 MEQ extended release tablet; Take 1.5 tablets  daily  Dispense: 120 tablet; Refill: 0     3.  A. Fib:   - Heart rate controlled. - Continues to follow with cardiologist and EP  -Continue high-dose aspirin daily and Calan SR  -CHADVAS2 score is 1-2.   As per EP note patient was given option for Coumadin but patient decided to be on aspirin only      Follow up in 3 months   Kristi Rashid MD

## 2021-03-11 NOTE — PROGRESS NOTES
Attending Physician Statement    S:   Chief Complaint   Patient presents with    Hypertension     f/u      HTN - not taking lisinopril because of concerns about side-effects. Home -544 systolic   ASCVD score 45%, but refuses statin   Takes ASA for paroxysmal atrial fib  O: Blood pressure (!) 150/84, pulse 69, temperature 97.5 °F (36.4 °C), temperature source Cerebral, resp. rate 16, height 5' 7\" (1.702 m), weight 191 lb (86.6 kg), SpO2 97 %. Exam:   Heart - RRR   Lungs - clear   Abd - benign  A: As above  P:  Continue current meds   Continue monitoring BP and consider adding other medication   Follow-up as ordered    I have discussed the case, including pertinent history and exam findings with the resident. I agree with the documented assessment and plan.

## 2021-03-12 ASSESSMENT — ENCOUNTER SYMPTOMS
SHORTNESS OF BREATH: 0
RHINORRHEA: 0
VOICE CHANGE: 0
VOMITING: 0
ABDOMINAL PAIN: 0
COUGH: 0
DIARRHEA: 0
COLOR CHANGE: 0
NAUSEA: 0

## 2021-03-23 ENCOUNTER — TELEPHONE (OUTPATIENT)
Dept: CARDIOLOGY | Age: 61
End: 2021-03-23

## 2021-04-16 ENCOUNTER — TELEPHONE (OUTPATIENT)
Dept: CARDIOLOGY | Age: 61
End: 2021-04-16

## 2021-05-10 ENCOUNTER — TELEPHONE (OUTPATIENT)
Dept: CARDIOLOGY | Age: 61
End: 2021-05-10

## 2021-05-11 ENCOUNTER — HOSPITAL ENCOUNTER (OUTPATIENT)
Dept: CARDIOLOGY | Age: 61
Discharge: HOME | End: 2021-05-11
Payer: COMMERCIAL

## 2021-05-11 DIAGNOSIS — R06.02 SHORTNESS OF BREATH: ICD-10-CM

## 2021-05-11 DIAGNOSIS — I48.0 PAF (PAROXYSMAL ATRIAL FIBRILLATION) (HCC): ICD-10-CM

## 2021-05-11 LAB
LV EF: 60 %
LVEF MODALITY: NORMAL

## 2021-05-11 PROCEDURE — 93306 TTE W/DOPPLER COMPLETE: CPT | Performed by: PSYCHIATRY & NEUROLOGY

## 2021-05-13 ENCOUNTER — TELEPHONE (OUTPATIENT)
Dept: CARDIOLOGY CLINIC | Age: 61
End: 2021-05-13

## 2021-05-13 NOTE — TELEPHONE ENCOUNTER
Contacted patient with echo results per Dr. Saul Cee. he verbalized understanding. Letter forwarded to Dr. Denisa Rocha.    ----- Message from Dar Dyer MD sent at 5/13/2021  6:09 AM EDT -----    Mayte Dials please tell the patient that his echo looks fine we do not see any abnormalities.

## 2021-05-17 DIAGNOSIS — E87.6 HYPOKALEMIA: ICD-10-CM

## 2021-05-17 DIAGNOSIS — I10 ESSENTIAL HYPERTENSION: ICD-10-CM

## 2021-05-17 RX ORDER — HYDROCHLOROTHIAZIDE 25 MG/1
25 TABLET ORAL DAILY
Qty: 30 TABLET | Refills: 3 | Status: SHIPPED
Start: 2021-05-17 | End: 2021-06-18 | Stop reason: SDUPTHER

## 2021-05-17 RX ORDER — POTASSIUM CHLORIDE 20 MEQ/1
TABLET, EXTENDED RELEASE ORAL
Qty: 120 TABLET | Refills: 0 | Status: SHIPPED
Start: 2021-05-17 | End: 2021-08-19 | Stop reason: SDUPTHER

## 2021-05-17 RX ORDER — VERAPAMIL HYDROCHLORIDE 240 MG/1
240 TABLET, FILM COATED, EXTENDED RELEASE ORAL DAILY
Qty: 30 TABLET | Refills: 0 | Status: SHIPPED
Start: 2021-05-17 | End: 2021-06-18 | Stop reason: SDUPTHER

## 2021-06-17 DIAGNOSIS — E87.6 HYPOKALEMIA: ICD-10-CM

## 2021-06-17 DIAGNOSIS — I10 ESSENTIAL HYPERTENSION: ICD-10-CM

## 2021-06-18 RX ORDER — VERAPAMIL HYDROCHLORIDE 240 MG/1
240 TABLET, FILM COATED, EXTENDED RELEASE ORAL DAILY
Qty: 30 TABLET | Refills: 3 | Status: SHIPPED
Start: 2021-06-18 | End: 2021-10-12 | Stop reason: SDUPTHER

## 2021-06-18 RX ORDER — HYDROCHLOROTHIAZIDE 25 MG/1
25 TABLET ORAL DAILY
Qty: 30 TABLET | Refills: 5 | Status: SHIPPED
Start: 2021-06-18 | End: 2021-10-19 | Stop reason: SDUPTHER

## 2021-08-19 DIAGNOSIS — E87.6 HYPOKALEMIA: ICD-10-CM

## 2021-08-19 RX ORDER — POTASSIUM CHLORIDE 20 MEQ/1
TABLET, EXTENDED RELEASE ORAL
Qty: 120 TABLET | Refills: 0 | Status: SHIPPED
Start: 2021-08-19 | End: 2021-10-19 | Stop reason: SDUPTHER

## 2021-10-12 DIAGNOSIS — I10 ESSENTIAL HYPERTENSION: ICD-10-CM

## 2021-10-12 RX ORDER — VERAPAMIL HYDROCHLORIDE 240 MG/1
240 TABLET, FILM COATED, EXTENDED RELEASE ORAL DAILY
Qty: 30 TABLET | Refills: 3 | Status: SHIPPED
Start: 2021-10-12 | End: 2022-01-12 | Stop reason: SDUPTHER

## 2021-10-19 ENCOUNTER — OFFICE VISIT (OUTPATIENT)
Dept: FAMILY MEDICINE CLINIC | Age: 61
End: 2021-10-19
Payer: COMMERCIAL

## 2021-10-19 VITALS
WEIGHT: 191 LBS | HEART RATE: 66 BPM | BODY MASS INDEX: 29.98 KG/M2 | RESPIRATION RATE: 12 BRPM | HEIGHT: 67 IN | SYSTOLIC BLOOD PRESSURE: 136 MMHG | OXYGEN SATURATION: 97 % | TEMPERATURE: 97.7 F | DIASTOLIC BLOOD PRESSURE: 86 MMHG

## 2021-10-19 DIAGNOSIS — I10 ESSENTIAL HYPERTENSION: ICD-10-CM

## 2021-10-19 DIAGNOSIS — I10 PRIMARY HYPERTENSION: Primary | ICD-10-CM

## 2021-10-19 DIAGNOSIS — I48.91 ATRIAL FIBRILLATION, UNSPECIFIED TYPE (HCC): ICD-10-CM

## 2021-10-19 DIAGNOSIS — E87.6 HYPOKALEMIA: ICD-10-CM

## 2021-10-19 LAB
BASOPHILS ABSOLUTE: 0.02 E9/L (ref 0–0.2)
BASOPHILS RELATIVE PERCENT: 0.4 % (ref 0–2)
EOSINOPHILS ABSOLUTE: 0.12 E9/L (ref 0.05–0.5)
EOSINOPHILS RELATIVE PERCENT: 2.2 % (ref 0–6)
HCT VFR BLD CALC: 42 % (ref 37–54)
HEMOGLOBIN: 15.3 G/DL (ref 12.5–16.5)
IMMATURE GRANULOCYTES #: 0.01 E9/L
IMMATURE GRANULOCYTES %: 0.2 % (ref 0–5)
LYMPHOCYTES ABSOLUTE: 1.38 E9/L (ref 1.5–4)
LYMPHOCYTES RELATIVE PERCENT: 25.3 % (ref 20–42)
MCH RBC QN AUTO: 31 PG (ref 26–35)
MCHC RBC AUTO-ENTMCNC: 36.4 % (ref 32–34.5)
MCV RBC AUTO: 85.2 FL (ref 80–99.9)
MONOCYTES ABSOLUTE: 0.53 E9/L (ref 0.1–0.95)
MONOCYTES RELATIVE PERCENT: 9.7 % (ref 2–12)
NEUTROPHILS ABSOLUTE: 3.39 E9/L (ref 1.8–7.3)
NEUTROPHILS RELATIVE PERCENT: 62.2 % (ref 43–80)
PDW BLD-RTO: 12.8 FL (ref 11.5–15)
PLATELET # BLD: 181 E9/L (ref 130–450)
PMV BLD AUTO: 11.7 FL (ref 7–12)
RBC # BLD: 4.93 E12/L (ref 3.8–5.8)
WBC # BLD: 5.5 E9/L (ref 4.5–11.5)

## 2021-10-19 PROCEDURE — 99213 OFFICE O/P EST LOW 20 MIN: CPT | Performed by: STUDENT IN AN ORGANIZED HEALTH CARE EDUCATION/TRAINING PROGRAM

## 2021-10-19 PROCEDURE — 93010 ELECTROCARDIOGRAM REPORT: CPT | Performed by: STUDENT IN AN ORGANIZED HEALTH CARE EDUCATION/TRAINING PROGRAM

## 2021-10-19 PROCEDURE — 93005 ELECTROCARDIOGRAM TRACING: CPT | Performed by: STUDENT IN AN ORGANIZED HEALTH CARE EDUCATION/TRAINING PROGRAM

## 2021-10-19 RX ORDER — POTASSIUM CHLORIDE 20 MEQ/1
TABLET, EXTENDED RELEASE ORAL
Qty: 120 TABLET | Refills: 0 | Status: SHIPPED
Start: 2021-10-19 | End: 2022-03-07 | Stop reason: SDUPTHER

## 2021-10-19 RX ORDER — HYDROCHLOROTHIAZIDE 25 MG/1
25 TABLET ORAL DAILY
Qty: 30 TABLET | Refills: 5 | Status: SHIPPED
Start: 2021-10-19 | End: 2022-03-07 | Stop reason: SDUPTHER

## 2021-10-19 RX ORDER — ASPIRIN 325 MG
325 TABLET, DELAYED RELEASE (ENTERIC COATED) ORAL DAILY
Qty: 90 TABLET | Refills: 3 | Status: SHIPPED
Start: 2021-10-19 | End: 2022-02-01 | Stop reason: ALTCHOICE

## 2021-10-19 SDOH — ECONOMIC STABILITY: FOOD INSECURITY: WITHIN THE PAST 12 MONTHS, THE FOOD YOU BOUGHT JUST DIDN'T LAST AND YOU DIDN'T HAVE MONEY TO GET MORE.: NEVER TRUE

## 2021-10-19 SDOH — ECONOMIC STABILITY: FOOD INSECURITY: WITHIN THE PAST 12 MONTHS, YOU WORRIED THAT YOUR FOOD WOULD RUN OUT BEFORE YOU GOT MONEY TO BUY MORE.: NEVER TRUE

## 2021-10-19 ASSESSMENT — PATIENT HEALTH QUESTIONNAIRE - PHQ9
2. FEELING DOWN, DEPRESSED OR HOPELESS: 0
SUM OF ALL RESPONSES TO PHQ QUESTIONS 1-9: 0
1. LITTLE INTEREST OR PLEASURE IN DOING THINGS: 0
SUM OF ALL RESPONSES TO PHQ QUESTIONS 1-9: 0
SUM OF ALL RESPONSES TO PHQ QUESTIONS 1-9: 0
SUM OF ALL RESPONSES TO PHQ9 QUESTIONS 1 & 2: 0

## 2021-10-19 ASSESSMENT — LIFESTYLE VARIABLES: HOW OFTEN DO YOU HAVE A DRINK CONTAINING ALCOHOL: NEVER

## 2021-10-19 ASSESSMENT — SOCIAL DETERMINANTS OF HEALTH (SDOH): HOW HARD IS IT FOR YOU TO PAY FOR THE VERY BASICS LIKE FOOD, HOUSING, MEDICAL CARE, AND HEATING?: NOT HARD AT ALL

## 2021-10-19 NOTE — PROGRESS NOTES
[facility] RESIDENCY PROGRAM  DATE OF VISIT : 10/20/2021    Patient : Grecia Cortez   Age : 63 y.o.  : 1960   MRN : [id number]       Chief Complaint :   Chief Complaint   Patient presents with   Eric Law     declines flu shot    Medication Refill       HPI : Grecia Cortez is 63 y.o. male past medical history of hypertension, A. fib and chronic hypokalemia who presented to the clinic today for hypertension follow-up. Hypertension: Today blood pressure is controlled. Patient is on hydrochlorothiazide 25 mg daily. Patient is trying to lose weight and doing a strict diet control. Denies any headache, dizziness, chest pain, shortness of breath or leg swelling. Atrial fibrillation: Rate controlled. Patient reports intermittent palpitation associated with atrial flutter sensation. However severity and frequency is decreasing for last few months. Last episode of palpitation was today morning which lasted for few seconds. Patient does follow with cardiology and EP. Patient was seen by EP in 2021. Currently currently on high-dose aspirin and verapamil 240 mg daily. Patient said he was on Eliquis before and switched to aspirin high-dose on last visit with EP. Recent echo in May 2021 showed ejection fraction of 55 to 65% with normal left ventricular and right ventricular function.         Past Medical History :  Past Medical History:   Diagnosis Date    Abdominal fibromatosis 2017    Atrial fibrillation (HCC)     Hyperlipidemia     Hypertension     Osteoarthritis     knees    Sinusitis      Medication List :    Current Outpatient Medications   Medication Sig Dispense Refill    aspirin 325 MG EC tablet Take 1 tablet by mouth daily 90 tablet 3    potassium chloride (KLOR-CON M) 20 MEQ extended release tablet Take 1.5 tablets  daily 120 tablet 0    hydroCHLOROthiazide (HYDRODIURIL) 25 MG tablet Take 1 tablet by mouth daily 30 tablet 5    verapamil (CALAN SR) 240 MG extended release tablet Take 1 tablet by mouth daily 30 tablet 3     No current facility-administered medications for this visit. Review of Systems :  Review of Systems   Constitutional: Negative for diaphoresis, fatigue and fever. HENT: Negative for congestion. Respiratory: Negative for cough, chest tightness and shortness of breath. Cardiovascular: Positive for palpitations. Negative for chest pain and leg swelling. Gastrointestinal: Negative for abdominal pain and blood in stool. Musculoskeletal: Negative for arthralgias. Neurological: Negative for dizziness, speech difficulty, weakness and light-headedness. Hematological: Does not bruise/bleed easily. Psychiatric/Behavioral: Negative for agitation. Physical Exam :    Vitals: /86 (Site: Left Upper Arm, Position: Sitting, Cuff Size: Medium Adult)   Pulse 66   Temp 97.7 °F (36.5 °C) (Temporal)   Resp 12   Ht 5' 7\" (1.702 m)   Wt 191 lb (86.6 kg)   SpO2 97%   BMI 29.91 kg/m²     General Appearance: Well developed, awake, alert, oriented, and not in acute distress   Neck: Supple, symmetrical, trachea midline. No JVD or carotid bruits. Chest wall/Lung: CTAB, respirations unlabored. No ronchi/wheezing/rales  Heart[de-identified] Irregularly irregular rhythm, normal S1 and S2, no murmurs, rubs or gallops. Abdomen: Soft, nontender, nondistended  Extremities: Extremities normal, atraumatic, no cyanosis, clubbing or edema. Skin: Skin color, texture, turgor normal, no rashes or lesions  Neurologic: Alert&Oriented x3. Motor and sensation grossly intact. Psychiatric: has a normal mood and affect. Behavior is normal.       Assessment & Plan :    Primary hypertension  -Controlled with medication  - hydroCHLOROthiazide (HYDRODIURIL) 25 MG tablet; Take 1 tablet by mouth daily  Dispense: 30 tablet; Refill: 5  - LIPID PANEL; Future  -Patient has high ASCVD score.   Discussed about benefit of statin therapy however patient declined and would like to check lipid panel today       Atrial fibrillation, unspecified type ([address]  -EKG performed in clinic which showed rate of 55 with atrial fibrillation which is unchanged from previous EKG in March 2021  -Patient is asymptomatic and hemodynamically stable   - CHADS-VASC 1-2 (HTN, subclinical coronary atherosclerosis)  -  aspirin 325 MG daily (patient is aware of Coumadin/DOAC, he would like to continue aspirin at this time). -Encourage patient to call and schedule appointment with EP or cardiology for further evaluation  -Advised patient to go to ER if symptom get worse  -Continue current regimen  - CBC WITH AUTO DIFFERENTIAL; Future  - COMPREHENSIVE METABOLIC PANEL; Future  - MAGNESIUM; Future  - PHOSPHORUS; Future  - TSH;  Future    Follow-up in 6 weeks for atrial fibrillation or sooner if needed        Amarilys Gallardo MD

## 2021-10-19 NOTE — PROGRESS NOTES
S: 63 y.o. male here for follow up of htn. He is trying to lose weight and taking hctz. No side effects. Sees cardiology/EP. The cardiologist switched him from eliquis to high dose aspirin. O: VS: /86 (Site: Left Upper Arm, Position: Sitting, Cuff Size: Medium Adult)   Pulse 66   Temp 97.7 °F (36.5 °C) (Temporal)   Resp 12   Ht 5' 7\" (1.702 m)   Wt 191 lb (86.6 kg)   SpO2 97%   BMI 29.91 kg/m²    General: NAD   CV:  RRR, no gallops, rubs, or murmurs   Resp: CTAB no R/R/W   Abd:  Soft, nontender, no masses    Ext:  no C/C/E  Impression/Plan:   1. Afib-check ekg-rate controlled afib. Labs. Follow up with cardiology and EP. 2. HTN-continue current treatment. rto in 3 months    Attending Physician Statement  I have discussed the case, including pertinent history and exam findings with the resident. I agree with the documented assessment and plan.         Stew Murillo MD

## 2021-10-20 ENCOUNTER — HOSPITAL ENCOUNTER (EMERGENCY)
Age: 61
Discharge: HOME | End: 2021-10-20
Payer: COMMERCIAL

## 2021-10-20 VITALS
TEMPERATURE: 97 F | HEART RATE: 92 BPM | OXYGEN SATURATION: 95 % | WEIGHT: 185 LBS | SYSTOLIC BLOOD PRESSURE: 125 MMHG | HEIGHT: 67 IN | RESPIRATION RATE: 16 BRPM | BODY MASS INDEX: 29.03 KG/M2 | DIASTOLIC BLOOD PRESSURE: 75 MMHG

## 2021-10-20 DIAGNOSIS — S61.011A LACERATION OF RIGHT THUMB WITHOUT FOREIGN BODY WITHOUT DAMAGE TO NAIL, INITIAL ENCOUNTER: Primary | ICD-10-CM

## 2021-10-20 LAB
ALBUMIN SERPL-MCNC: 4.4 G/DL (ref 3.5–5.2)
ALP BLD-CCNC: 70 U/L (ref 40–129)
ALT SERPL-CCNC: 17 U/L (ref 0–40)
ANION GAP SERPL CALCULATED.3IONS-SCNC: 9 MMOL/L (ref 7–16)
AST SERPL-CCNC: 22 U/L (ref 0–39)
BILIRUB SERPL-MCNC: 0.6 MG/DL (ref 0–1.2)
BUN BLDV-MCNC: 13 MG/DL (ref 6–20)
CALCIUM SERPL-MCNC: 9.1 MG/DL (ref 8.6–10.2)
CHLORIDE BLD-SCNC: 99 MMOL/L (ref 98–107)
CHOLESTEROL, TOTAL: 210 MG/DL (ref 0–199)
CO2: 28 MMOL/L (ref 22–29)
CREAT SERPL-MCNC: 1 MG/DL (ref 0.7–1.2)
GFR AFRICAN AMERICAN: >60
GFR NON-AFRICAN AMERICAN: >60 ML/MIN/1.73
GLUCOSE BLD-MCNC: 94 MG/DL (ref 74–99)
HDLC SERPL-MCNC: 47 MG/DL
LDL CHOLESTEROL CALCULATED: 156 MG/DL (ref 0–99)
MAGNESIUM: 2.2 MG/DL (ref 1.6–2.6)
PHOSPHORUS: 3.8 MG/DL (ref 2.5–4.5)
POTASSIUM SERPL-SCNC: 4.1 MMOL/L (ref 3.5–5)
SODIUM BLD-SCNC: 136 MMOL/L (ref 132–146)
TOTAL PROTEIN: 6.8 G/DL (ref 6.4–8.3)
TRIGL SERPL-MCNC: 37 MG/DL (ref 0–149)
TSH SERPL DL<=0.05 MIU/L-ACNC: 3.26 UIU/ML (ref 0.27–4.2)
VLDLC SERPL CALC-MCNC: 7 MG/DL

## 2021-10-20 PROCEDURE — 12001 RPR S/N/AX/GEN/TRNK 2.5CM/<: CPT

## 2021-10-20 PROCEDURE — 90714 TD VACC NO PRESV 7 YRS+ IM: CPT | Performed by: PHYSICIAN ASSISTANT

## 2021-10-20 PROCEDURE — 6370000000 HC RX 637 (ALT 250 FOR IP): Performed by: PHYSICIAN ASSISTANT

## 2021-10-20 PROCEDURE — 99283 EMERGENCY DEPT VISIT LOW MDM: CPT

## 2021-10-20 PROCEDURE — 90471 IMMUNIZATION ADMIN: CPT | Performed by: PHYSICIAN ASSISTANT

## 2021-10-20 PROCEDURE — 6360000002 HC RX W HCPCS: Performed by: PHYSICIAN ASSISTANT

## 2021-10-20 RX ORDER — DIAPER,BRIEF,INFANT-TODD,DISP
EACH MISCELLANEOUS ONCE
Status: COMPLETED | OUTPATIENT
Start: 2021-10-20 | End: 2021-10-20

## 2021-10-20 RX ORDER — TETANUS AND DIPHTHERIA TOXOIDS ADSORBED 2; 2 [LF]/.5ML; [LF]/.5ML
0.5 INJECTION INTRAMUSCULAR ONCE
Status: COMPLETED | OUTPATIENT
Start: 2021-10-20 | End: 2021-10-20

## 2021-10-20 RX ADMIN — TETANUS AND DIPHTHERIA TOXOIDS ADSORBED 0.5 ML: 2; 2 INJECTION INTRAMUSCULAR at 21:15

## 2021-10-20 RX ADMIN — BACITRACIN ZINC: 500 OINTMENT TOPICAL at 21:16

## 2021-10-20 ASSESSMENT — ENCOUNTER SYMPTOMS
SHORTNESS OF BREATH: 0
ABDOMINAL PAIN: 0
COUGH: 0
CHEST TIGHTNESS: 0
BLOOD IN STOOL: 0

## 2021-10-21 NOTE — ED NOTES
Discharged   To follow with clinic for suture removal-    Right hand cleaned with saf cleanse-  Dried and bacitracin/bandaid  cdc sheet given     Santino Campos RN  10/20/21 2123

## 2021-10-21 NOTE — ED PROVIDER NOTES
Independent:    HPI:  10/20/21, Time: 8:38 PM EDT         Celena Hu is a 63 y.o. male presenting to the ED for laceration to the base of his right thumb, beginning prior to coming to ER. He was using a straight blade to cut open a box when he accidentally cut his thumb. He is unsure of his last tetanus. He did have a large amount of bleeding which was controlled at the time of my exam. He complains of some local pain, no numbness or tingling. The complaint has been intermittent, mild in severity. Review of Systems:   A complete review of systems was performed and pertinent positives and negatives are stated within HPI, all other systems reviewed and are negative.      --------------------------------------------- PAST HISTORY ---------------------------------------------  Past Medical History:  has a past medical history of Abdominal fibromatosis, Atrial fibrillation ([facility] [address] Hyperlipidemia, Hypertension, Osteoarthritis, and Sinusitis. Past Surgical History:  has a past surgical history that includes Knee arthroscopy (Bilateral, 2009, 2010) and Colonoscopy (04/11/2012). Social History:  reports that he has never smoked. He has never used smokeless tobacco. He reports current alcohol use. He reports that he does not use drugs. Family History: family history includes High Blood Pressure in his father and mother; No Known Problems in his brother, brother, brother, and sister. The patients home medications have been reviewed. Allergies: Patient has no known allergies. -------------------------------------------------- RESULTS -------------------------------------------------  All laboratory and radiology results have been personally reviewed by myself   LABS:  No results found for this visit on 10/20/21.     RADIOLOGY:  Interpreted by Radiologist.  No orders to display       ------------------------- NURSING NOTES AND VITALS REVIEWED ---------------------------   The nursing notes within the ED encounter and vital signs as below have been reviewed. /75   Pulse 92   Temp 97 °F (36.1 °C) (Temporal)   Resp 16   Ht 5' 7\" (1.702 m)   Wt 185 lb (83.9 kg)   SpO2 95%   BMI 28.98 kg/m²   Oxygen Saturation Interpretation: Normal      ---------------------------------------------------PHYSICAL EXAM--------------------------------------      Constitutional/General: Alert and oriented x3, well appearing, non toxic in NAD  Head: Normocephalic and atraumatic  Eyes: PERRL, EOMI  Mouth: Oropharynx clear  Neck: Supple, full ROM,   Pulmonary: Lungs clear to auscultation bilaterally. Not in respiratory distress  Cardiovascular:  Regular rate and rhythm, 2+ distal pulses  Extremities: Moves all extremities x 4Local 1.5cm laceration noted to dorsal base of right thumb, no active bleeding and no obvious FB or tendon injury noted. Patient able to fully flex and extend right thumb with no difficulty. Distal sensory and capillary refill brisk of digit with no difficulty. Patient able to adduct and abduct with no difficulty. . Warm and well perfused  Skin: warm and dry without rash  Neurologic: GCS 15  Psych: Normal Affect      LACERATION REPAIR  PROCEDURE NOTE:  Unless otherwise indicated, this procedure was done or directly supervised by the ED attending. Laceration #: 1. Location: dorsal base of right thumb  Length: 1.5 cm. The wound area was irrigated with sterile saline, cleansed with povidone iodine, cleansend with shur-clens and draped in a sterile fashion. The wound area was anesthetized with Lidocaine 1% without epinephrine. WOUND COMPLEXITY:    Debridement: None. Undermining: None. Wound Margins Revised: None. Flaps Aligned: no. The wound was explored with the following results no foreign body or tendon injury seen. The wound was closed with 5-0 Prolene using interrupted sutures. Dressing:  bacitracin, a sterile dressing and a bandage was placed.     Total number suture: 4 sutures placed,

## 2021-11-02 ENCOUNTER — OFFICE VISIT (OUTPATIENT)
Dept: FAMILY MEDICINE CLINIC | Age: 61
End: 2021-11-02
Payer: COMMERCIAL

## 2021-11-02 VITALS
HEIGHT: 67 IN | RESPIRATION RATE: 14 BRPM | OXYGEN SATURATION: 97 % | DIASTOLIC BLOOD PRESSURE: 80 MMHG | BODY MASS INDEX: 30.13 KG/M2 | HEART RATE: 76 BPM | TEMPERATURE: 97.7 F | WEIGHT: 192 LBS | SYSTOLIC BLOOD PRESSURE: 141 MMHG

## 2021-11-02 DIAGNOSIS — E78.5 HYPERLIPIDEMIA, UNSPECIFIED HYPERLIPIDEMIA TYPE: ICD-10-CM

## 2021-11-02 DIAGNOSIS — Z48.02 ENCOUNTER FOR REMOVAL OF SUTURES: Primary | ICD-10-CM

## 2021-11-02 PROCEDURE — 99212 OFFICE O/P EST SF 10 MIN: CPT | Performed by: STUDENT IN AN ORGANIZED HEALTH CARE EDUCATION/TRAINING PROGRAM

## 2021-11-02 PROCEDURE — 99213 OFFICE O/P EST LOW 20 MIN: CPT | Performed by: STUDENT IN AN ORGANIZED HEALTH CARE EDUCATION/TRAINING PROGRAM

## 2021-11-02 ASSESSMENT — ENCOUNTER SYMPTOMS
BLOOD IN STOOL: 0
COUGH: 0
ABDOMINAL PAIN: 0

## 2021-11-02 NOTE — PROGRESS NOTES
[address] [facility] MEDICINE RESIDENCY PROGRAM  DATE OF VISIT : 2021    Patient : Dot Hidalgo   Age : 63 y.o.  : 1960   MRN : [id number]       Chief Complaint :   Chief Complaint   Patient presents with    Suture / Staple Removal       HPI : Dot Hidalgo is 63 y.o. male who presented to the clinic today for ED discharge follow up and Suture removal. Patient went to ER on 10/20/21 for cut injury on right thumb. Laceration repaired and placed 4 sutures. Patient reports doing well. No redness or swelling. No fever. Received tetanus shot in ED. Medication List :    Current Outpatient Medications   Medication Sig Dispense Refill    aspirin 325 MG EC tablet Take 1 tablet by mouth daily 90 tablet 3    potassium chloride (KLOR-CON M) 20 MEQ extended release tablet Take 1.5 tablets  daily 120 tablet 0    hydroCHLOROthiazide (HYDRODIURIL) 25 MG tablet Take 1 tablet by mouth daily 30 tablet 5    verapamil (CALAN SR) 240 MG extended release tablet Take 1 tablet by mouth daily 30 tablet 3     No current facility-administered medications for this visit. Review of Systems :  Review of Systems   Constitutional: Negative for activity change and fatigue. HENT: Negative for congestion. Respiratory: Negative for cough. Cardiovascular: Negative for chest pain, palpitations and leg swelling. Gastrointestinal: Negative for abdominal pain and blood in stool. Genitourinary: Negative for difficulty urinating. Musculoskeletal: Negative for arthralgias. Neurological: Negative for syncope, speech difficulty, weakness, numbness and headaches. Hematological: Negative for adenopathy. Does not bruise/bleed easily. Psychiatric/Behavioral: Negative for agitation.      Physical Exam :    Vitals: BP (!) 141/80 (Site: Left Upper Arm, Position: Sitting, Cuff Size: Medium Adult)   Pulse 76   Temp 97.7 °F (36.5 °C) (Temporal)   Resp 14   Ht 5' 7\" (1.702 m)   Wt 192 lb (87.1 kg) SpO2 97%   BMI 30.07 kg/m²   General Appearance: Well developed, awake, alert, oriented, and in NAD  Chest wall/Lung: CTAB, respirations unlabored. No ronchi/wheezing/rales   Heart[de-identified] RRR, normal S1 and S2  Abdomen: SNTND  Extremities:  4 Sutures in right thumb, no signs symptoms of infection,    Assessment & Plan :    1. Encounter for removal of sutures  - removed 4 sutures  - no signs of infection    2. Hyperlipidemia, unspecified hyperlipidemia type  - Discussed about starting Statin, patient refused and would like to have fasting lipid panel  - The 10-year ASCVD risk score (Willy Roque, et al., 2013) is: 15.8%    Values used to calculate the score:      Age: 63 years      Sex: Male      Is Non- : Yes      Diabetic: No      Tobacco smoker: No      Systolic Blood Pressure: 572 mmHg      Is BP treated: Yes      HDL Cholesterol: 47 mg/dL      Total Cholesterol: 210 mg/dL  - LIPID PANEL;  Future    Reviewed labs and notified patient       Follow up in 3 months for DM       Earlene David MD

## 2021-11-02 NOTE — PROGRESS NOTES
Attending Physician Statement    S:   Chief Complaint   Patient presents with    Suture / Staple Removal      Was in the ER 10 days ago. Laceration to base of right thumb. Has 4 sutures. Had Tdap shot. Doing well. No signs of infection. No pain or redness. Removed 4 sutures without issue. O: Blood pressure (!) 141/80, pulse 76, temperature 97.7 °F (36.5 °C), temperature source Temporal, resp. rate 14, height 5' 7\" (1.702 m), weight 192 lb (87.1 kg), SpO2 97 %. Exam:   Heart - RRR   Lungs - clear   Skin - well healed laceration. A: suture removal   P:  Tolerated well    Follow-up as ordered    Attending Attestation   I have discussed the case, including pertinent history and exam findings with the resident. I agree with the documented assessment and plan.

## 2022-01-05 ENCOUNTER — HOSPITAL ENCOUNTER (EMERGENCY)
Age: 62
Discharge: HOME | End: 2022-01-06
Attending: EMERGENCY MEDICINE
Payer: COMMERCIAL

## 2022-01-05 DIAGNOSIS — T78.3XXA ANGIOEDEMA, INITIAL ENCOUNTER: Primary | ICD-10-CM

## 2022-01-05 PROCEDURE — 96375 TX/PRO/DX INJ NEW DRUG ADDON: CPT

## 2022-01-05 PROCEDURE — 6360000002 HC RX W HCPCS: Performed by: EMERGENCY MEDICINE

## 2022-01-05 PROCEDURE — 96374 THER/PROPH/DIAG INJ IV PUSH: CPT

## 2022-01-05 PROCEDURE — 99283 EMERGENCY DEPT VISIT LOW MDM: CPT

## 2022-01-05 PROCEDURE — 2500000003 HC RX 250 WO HCPCS: Performed by: EMERGENCY MEDICINE

## 2022-01-05 PROCEDURE — 2580000003 HC RX 258: Performed by: EMERGENCY MEDICINE

## 2022-01-05 PROCEDURE — 2500000003 HC RX 250 WO HCPCS

## 2022-01-05 RX ORDER — 0.9 % SODIUM CHLORIDE 0.9 %
500 INTRAVENOUS SOLUTION INTRAVENOUS ONCE
Status: COMPLETED | OUTPATIENT
Start: 2022-01-05 | End: 2022-01-06

## 2022-01-05 RX ORDER — DIPHENHYDRAMINE HYDROCHLORIDE 50 MG/ML
50 INJECTION INTRAMUSCULAR; INTRAVENOUS ONCE
Status: COMPLETED | OUTPATIENT
Start: 2022-01-05 | End: 2022-01-05

## 2022-01-05 RX ORDER — TRANEXAMIC ACID 100 MG/ML
INJECTION, SOLUTION INTRAVENOUS
Status: COMPLETED
Start: 2022-01-05 | End: 2022-01-05

## 2022-01-05 RX ADMIN — DIPHENHYDRAMINE HYDROCHLORIDE 50 MG: 50 INJECTION, SOLUTION INTRAMUSCULAR; INTRAVENOUS at 23:33

## 2022-01-05 RX ADMIN — FAMOTIDINE 20 MG: 10 INJECTION INTRAVENOUS at 23:33

## 2022-01-05 RX ADMIN — TRANEXAMIC ACID 1000 MG: 1 INJECTION, SOLUTION INTRAVENOUS at 23:33

## 2022-01-05 RX ADMIN — SODIUM CHLORIDE 500 ML: 9 INJECTION, SOLUTION INTRAVENOUS at 23:33

## 2022-01-06 VITALS
DIASTOLIC BLOOD PRESSURE: 78 MMHG | TEMPERATURE: 98 F | WEIGHT: 185 LBS | HEART RATE: 72 BPM | BODY MASS INDEX: 29.03 KG/M2 | OXYGEN SATURATION: 98 % | RESPIRATION RATE: 16 BRPM | SYSTOLIC BLOOD PRESSURE: 144 MMHG | HEIGHT: 67 IN

## 2022-01-06 RX ORDER — EPINEPHRINE 0.3 MG/.3ML
0.3 INJECTION SUBCUTANEOUS
Qty: 1 EACH | Refills: 0 | Status: SHIPPED | OUTPATIENT
Start: 2022-01-06 | End: 2022-01-12 | Stop reason: SDUPTHER

## 2022-01-06 RX ORDER — FAMOTIDINE 20 MG/1
20 TABLET, FILM COATED ORAL 2 TIMES DAILY
Qty: 10 TABLET | Refills: 0 | Status: SHIPPED | OUTPATIENT
Start: 2022-01-06 | End: 2022-03-01

## 2022-01-06 RX ORDER — LORATADINE 10 MG/1
10 TABLET ORAL DAILY
Qty: 5 TABLET | Refills: 0 | Status: SHIPPED | OUTPATIENT
Start: 2022-01-06 | End: 2022-02-01

## 2022-01-06 NOTE — ED NOTES
He states he feels much better since first arrival and swelling in mouth has decreased.      Samuel Roldan RN  01/06/22 1124

## 2022-01-06 NOTE — ED PROVIDER NOTES
HPI:  1/5/22, Time: 11:05 PM JOSE A Tang is a 63 y.o. male presenting to the ED for tongue swelling, beginning 2 hours ago. The complaint has been persistent, moderate in severity, and worsened by nothing. Patient states that he had an episode several months ago due to eating apples. Patient states that this episode resolved on its own and the patient did not seek any medical treatment nor inform his family doctor regarding this. The patient states that he was eating apples again over the past 2 days and noted worsening swelling that just began 2 hours ago. He denies any other known exposures. He denies any change in his medications recently and he does not have any ACE inhibitor is on his list of blood pressure medicines. He denies any rashes elsewhere and denies any swelling or any other areas of his body. No chest pain, no shortness of breath, no nausea/vomiting or any difficulty breathing associated. No other complaints. Review of Systems:   A complete review of systems was performed and pertinent positives and negatives are stated within HPI, all other systems reviewed and are negative.    -------------------------------------------- PAST HISTORY ---------------------------------------------  Past Medical History:  has a past medical history of Abdominal fibromatosis, Atrial fibrillation ([facility] [address] Hyperlipidemia, Hypertension, Osteoarthritis, and Sinusitis. Past Surgical History:  has a past surgical history that includes Knee arthroscopy (Bilateral, 2009, 2010) and Colonoscopy (04/11/2012). Social History:  reports that he has never smoked. He has never used smokeless tobacco. He reports current alcohol use. He reports that he does not use drugs. Family History: family history includes High Blood Pressure in his father and mother; No Known Problems in his brother, brother, brother, and sister. The patients home medications have been reviewed.     Allergies: Patient has no known allergies. -------------------------------------------------- RESULTS -------------------------------------------------  All laboratory and radiology results have been personally reviewed by myself   LABS:  No results found for this visit on 01/05/22. RADIOLOGY:  Interpreted by Radiologist.  No orders to display       ------------------------- NURSING NOTES AND VITALS REVIEWED ---------------------------   The nursing notes within the ED encounter and vital signs as below have been reviewed. BP (!) 144/78   Pulse 72   Temp 98 °F (36.7 °C) (Oral)   Resp 16   Ht 5' 7\" (1.702 m)   Wt 185 lb (83.9 kg)   SpO2 98%   BMI 28.98 kg/m²   Oxygen Saturation Interpretation: Normal      ---------------------------------------------------PHYSICAL EXAM--------------------------------------      Constitutional/General: Alert and oriented x3, well appearing, non toxic in NAD  Head: Normocephalic and atraumatic  Eyes: PERRL, EOMI  Mouth: Oropharynx clear, handling secretions, no trismus  Neck: Supple, full ROM,   Pulmonary: Lungs clear to auscultation bilaterally, no wheezes, rales, or rhonchi. Not in respiratory distress  Cardiovascular:  Regular rate and rhythm, no murmurs, gallops, or rubs. 2+ distal pulses  Abdomen: Soft, non tender, non distended,   Extremities: Moves all extremities x 4.  Warm and well perfused  Skin: warm and dry without rash  Neurologic: GCS 15,  Psych: Normal Affect      ------------------------------ ED COURSE/MEDICAL DECISION MAKING----------------------  Medications   tranexamic acid (CYKLOKAPRON) 1,000 mg in dextrose 5 % 100 mL IVPB ( IntraVENous Canceled Entry 1/5/22 2334)   0.9 % sodium chloride bolus (0 mLs IntraVENous Stopped 1/6/22 0003)   diphenhydrAMINE (BENADRYL) injection 50 mg (50 mg IntraVENous Given 1/5/22 2333)   famotidine (PEPCID) injection 20 mg (20 mg IntraVENous Given 1/5/22 2333)   tranexamic acid (CYKLOKAPRON) 1000 MG/10ML injection (1,000 mg  Given 1/5/22 2333) ED COURSE:       Medical Decision Making:   Patient has non-ACE inhibitor induced angioedema likely related to food allergic response with no evidence of any systemic urticaria new or systemic pruritus. Patient did receive tranexamic acid 1 g IV and this resulted in significant improvement plus patient did also receive antihistamine therapy with Benadryl plus Pepcid. Serial re-examinations:  1/6/22   1:58 AM EST  Subjective the patient's angioedema seems to be significantly improved on reassessment. Patient's not had any worsening course at all. Vital Signs:   Vitals:    01/05/22 2254 01/05/22 2257 01/06/22 0005 01/06/22 0158   BP:  (!) 168/107 (!) 160/100 (!) 144/78   Pulse:  78 80 72   Resp:  16  16   Temp:  98 °F (36.7 °C)  98 °F (36.7 °C)   TempSrc:  Temporal  Oral   SpO2:  96% 95% 98%   Weight: 185 lb (83.9 kg)      Height: 5' 7\" (1.702 m)        Card/Pulm:  Rhythm: normal rate. Heart Sounds: no murmurs, gallops, or rubs. clear to auscultation, no wheezes or rales and unlabored breathing. Capillary Refill: normal.  Radial Pulse:  present 2+. Skin:  Dry. Counseling: The emergency provider has spoken with the patient and discussed todays results, in addition to providing specific details for the plan of care and counseling regarding the diagnosis and prognosis. Questions are answered at this time and they are agreeable with the plan. Critical care time:  Please note that the withdrawal or failure to initiate urgent interventions for this patient would likely result in a life threatening deterioration or permanent disability. Accordingly this patient received 30 minutes of critical care time, excluding separately billable procedures. --------------------------------- IMPRESSION AND DISPOSITION ---------------------------------    IMPRESSION  1.  Angioedema, initial encounter        DISPOSITION  Disposition: Discharge to home  Patient condition is stable      NOTE: This report was transcribed using voice recognition software.  Every effort was made to ensure accuracy; however, inadvertent computerized transcription errors may be present        Consuelo Cardenas MD  01/06/22 0202

## 2022-01-06 NOTE — ED NOTES
Patient states he is in afib. Has upcoming cardiology appt but has not notified cardiology that he is in afib.   + swelling of tongue     Jeni Zamora RN  01/05/22 8363

## 2022-01-12 ENCOUNTER — TELEPHONE (OUTPATIENT)
Dept: CARDIOLOGY CLINIC | Age: 62
End: 2022-01-12

## 2022-01-12 ENCOUNTER — OFFICE VISIT (OUTPATIENT)
Dept: FAMILY MEDICINE CLINIC | Age: 62
End: 2022-01-12
Payer: COMMERCIAL

## 2022-01-12 VITALS
HEART RATE: 68 BPM | BODY MASS INDEX: 30.13 KG/M2 | DIASTOLIC BLOOD PRESSURE: 78 MMHG | TEMPERATURE: 97.6 F | HEIGHT: 67 IN | RESPIRATION RATE: 18 BRPM | OXYGEN SATURATION: 99 % | WEIGHT: 192 LBS | SYSTOLIC BLOOD PRESSURE: 136 MMHG

## 2022-01-12 DIAGNOSIS — G89.29 CHRONIC PAIN OF BOTH KNEES: ICD-10-CM

## 2022-01-12 DIAGNOSIS — I48.91 ATRIAL FIBRILLATION, UNSPECIFIED TYPE (HCC): Primary | ICD-10-CM

## 2022-01-12 DIAGNOSIS — M25.562 CHRONIC PAIN OF BOTH KNEES: ICD-10-CM

## 2022-01-12 DIAGNOSIS — I10 ESSENTIAL HYPERTENSION: ICD-10-CM

## 2022-01-12 DIAGNOSIS — M25.561 CHRONIC PAIN OF BOTH KNEES: ICD-10-CM

## 2022-01-12 DIAGNOSIS — T78.3XXD ANGIOEDEMA, SUBSEQUENT ENCOUNTER: ICD-10-CM

## 2022-01-12 PROCEDURE — 93010 ELECTROCARDIOGRAM REPORT: CPT | Performed by: STUDENT IN AN ORGANIZED HEALTH CARE EDUCATION/TRAINING PROGRAM

## 2022-01-12 PROCEDURE — 93005 ELECTROCARDIOGRAM TRACING: CPT | Performed by: STUDENT IN AN ORGANIZED HEALTH CARE EDUCATION/TRAINING PROGRAM

## 2022-01-12 PROCEDURE — 99213 OFFICE O/P EST LOW 20 MIN: CPT | Performed by: STUDENT IN AN ORGANIZED HEALTH CARE EDUCATION/TRAINING PROGRAM

## 2022-01-12 PROCEDURE — G0463 HOSPITAL OUTPT CLINIC VISIT: HCPCS | Performed by: STUDENT IN AN ORGANIZED HEALTH CARE EDUCATION/TRAINING PROGRAM

## 2022-01-12 RX ORDER — EPINEPHRINE 0.3 MG/.3ML
0.3 INJECTION SUBCUTANEOUS
Qty: 1 EACH | Refills: 0 | Status: SHIPPED
Start: 2022-01-12 | End: 2022-08-18 | Stop reason: SDUPTHER

## 2022-01-12 RX ORDER — VERAPAMIL HYDROCHLORIDE 240 MG/1
240 TABLET, FILM COATED, EXTENDED RELEASE ORAL DAILY
Qty: 30 TABLET | Refills: 3 | Status: SHIPPED
Start: 2022-01-12 | End: 2022-03-07 | Stop reason: SDUPTHER

## 2022-01-12 NOTE — TELEPHONE ENCOUNTER
Contacted patient and scheduled monitor per Dr. Lee Brochure instructions.    ----- Message from Zenon Marques MD sent at 1/12/2022  3:38 PM EST -----  Former rr   To see me in feb  Arrange zio xt 14 day prior to that visit dx afib

## 2022-01-12 NOTE — PROGRESS NOTES
S: 63 y.o. male presents today for:   ED follow-up for oral swelling after eating an apple. No ACE-I. Similar issues 1 year ago with apples. Diagnosed with angioedema. Treated and discharged, resolved. Asymptomatic. resolved. A. Fib- on CCB and ASA. Increased fluttering last few weeks. Does have cadrio visit 2/1/22.     bilateral knee pain, edema. Worsening. No xrays. O: VS: /78 (Site: Left Upper Arm, Position: Sitting, Cuff Size: Large Adult)   Pulse 68   Temp 97.6 °F (36.4 °C) (Temporal)   Resp 18   Ht 5' 7\" (1.702 m)   Wt 192 lb (87.1 kg)   SpO2 99%   BMI 30.07 kg/m²   AAO/NAD, appropriate affect for mood  CV:  RRR, no murmur  Resp: CTAB  FROM knees-B, no joint line tenderness, negative A/P drawer, mild effusion      Impression/Plan:   1) angioedema- allergist, avoid apples  2) a fib- EKG  3) knee pain- xray and possible referral to Ortho      Attending Physician Statement  I have discussed the case, including pertinent history and exam findings with the resident. I agree with the documented assessment and plan.       Dana Hinojosa,

## 2022-01-12 NOTE — PROGRESS NOTES
[facility] RESIDENCY PROGRAM  DATE OF VISIT : 2022    Patient : Ruma Joiner   Age : 63 y.o.  : 1960   MRN : [id number]     Chief Complaint :   Chief Complaint   Patient presents with    ED Follow-up    Referral - General       HPI : Ruma Joiner is 63 y.o. male who presented to the clinic today for ED discharge follow-up. Patient known to ER in  for tongue and lip swelling started while he was eating apple. Patient had similar symptoms 1 year ago after he ate apple. At that time symptoms resolved spontaneously. Not on any offending medication including ACE or ARB. Patient was diagnosed with possible angioedema related to food allergies. Patient was treated with transonic acid in ER. Patient was discharged on Pepcid and Benadryl. Currently patient reports his symptoms completely resolved. Denies any difficulty breathing, facial swelling, dysphagia, nausea or vomiting, allergic rash. A. fib: Patient is complaining intermittent palpitation 2 or 3 times in a week. Get better when he rest. Sometimes he feels like he is having flutters. Associated symptoms are lightheaded, dizziness and chest tightness. currently on verapamil 250 mg once daily and aspirin 325 daily. Patient was seen by EP in 2021. Patient has appointment with cardiology in . As per EP note discussed about atrial rhythm control including type I/III antiarrhythmic drug and catheter ablation. Also discussed about restarting DOAC or Coumadin therapy that time. Patient agreed to continue aspirin. However patient did not continue to follow-up with EP. Chronic bilateral knee pain: Patient does complain of bilateral knee pain for last 1 year. Pain is sharp, located on anterior posterior knee associated with swelling. Pain get worse with walking. Symptoms are getting worse for last few months. History of bilateral knee arthroscopy surgery many years ago.  Patient is using Size: Large Adult)   Pulse 68   Temp 97.6 °F (36.4 °C) (Temporal)   Resp 18   Ht 5' 7\" (1.702 m)   Wt 192 lb (87.1 kg)   SpO2 99%   BMI 30.07 kg/m²   General Appearance: Well developed, awake, alert, oriented, and not in acute distress  HEENT: NCAT, MMM, no pallor or icterus. Neck: Supple, symmetrical, trachea midline. No JVD or carotid bruits. Chest wall/Lung: CTAB, respirations unlabored. No ronchi/wheezing/rales   Heart: Rate controlled, irregularly rhythm,  normal S1 and S2, no murmurs, rubs or gallops. Abdomen: Soft, nontender, nondistended  Extremities: Normal range of motion bilateral knee, evidence of mild effusion on both knee more on left side, no obvious swelling or injuries   skin: Skin color, texture, turgor normal, no rashes or lesions  Neurologic: Alert&Oriented x3. Moves all 4 limbs. Sensation grossly intact. Psychiatric: has a normal mood and affect. Behavior is normal.     Assessment & Plan :     Atrial fibrillation, unspecified type (HCC)  -Rate is controlled, irregular rhythm  -hx chronic PA  - CHADS-VASC 1-2 (HTN, subclinical coronary atherosclerosis): compliant with and tolerant of aspirin 325 QD.  -Was seen and evaluated by EP in March 2021. As per EP note discussed about rhythm control including antiarrhythmic  Vs catheter ablation. However patient did not continue to follow-up. - patient still endorses intermittent palpitation with flutter, 2-3 times in a week  - EKG 12 done today which showed atrial flutter with fib, normal heart rate  -  verapamil (CALAN SR) 240 MG extended release tablet; Take 1 tablet by mouth daily  Dispense: 30 tablet; Refill: 3  -Patient has appointment with cardiologist in February 1.  Case discussed with Dr. Jay Washburn and he is recommended Holter monitor for 14 days and referral to EP  -Recent labs showed electrolyte are within normal limits  - Vira White MD, Electrophysiology, [place]    Angioedema, subsequent encounter  -Likely secondary to apical  -We will refer to [facility] for allergy test and further evaluation  - [facility]  -Did refill on EPI pen and advised patient to use if he needed  -Avoid apple and close monitoring of    4. Chronic pain of both knees  -History of bilateral knee surgery many years ago. -Does complain of bilateral knee pain and swelling, more prominent on left side  -We will do x-ray and refer to orthopedic for further evaluation  - XR KNEE LEFT (3 VIEWS); Future  - XR KNEE RIGHT (3 VIEWS); Future  - Gas[facility] Bronson 15, fall 2, [facility], [facility]    Follow-up in 1 month for atrial fibrillation. Advised patient to go to ER if he develops chest pain, worsening palpitation or fluttering associated with syncope for further evaluation.       Debra Dodd MD

## 2022-01-13 ENCOUNTER — NURSE ONLY (OUTPATIENT)
Dept: CARDIOLOGY CLINIC | Age: 62
End: 2022-01-13

## 2022-01-13 ASSESSMENT — ENCOUNTER SYMPTOMS
COUGH: 0
SHORTNESS OF BREATH: 0
NAUSEA: 0
ABDOMINAL PAIN: 0

## 2022-01-13 NOTE — PROGRESS NOTES
Patient was in today for 14 day Zio  XT monitor per Kelly Johnson. Patient was given instructions and questions answered, verbalize understanding.      Serial number: [id number]      Maury Crocker MA

## 2022-01-14 ENCOUNTER — OFFICE VISIT (OUTPATIENT)
Dept: ORTHOPEDIC SURGERY | Age: 62
End: 2022-01-14
Payer: COMMERCIAL

## 2022-01-14 ENCOUNTER — TELEPHONE (OUTPATIENT)
Dept: FAMILY MEDICINE CLINIC | Age: 62
End: 2022-01-14

## 2022-01-14 VITALS — TEMPERATURE: 98 F | WEIGHT: 185 LBS | HEIGHT: 67 IN | BODY MASS INDEX: 29.03 KG/M2

## 2022-01-14 DIAGNOSIS — M25.562 PAIN IN BOTH KNEES, UNSPECIFIED CHRONICITY: Primary | ICD-10-CM

## 2022-01-14 DIAGNOSIS — M17.11 PRIMARY OSTEOARTHRITIS OF RIGHT KNEE: Primary | ICD-10-CM

## 2022-01-14 DIAGNOSIS — M17.12 PRIMARY OSTEOARTHRITIS OF LEFT KNEE: ICD-10-CM

## 2022-01-14 DIAGNOSIS — M25.561 PAIN IN BOTH KNEES, UNSPECIFIED CHRONICITY: Primary | ICD-10-CM

## 2022-01-14 PROCEDURE — 20610 DRAIN/INJ JOINT/BURSA W/O US: CPT | Performed by: ORTHOPAEDIC SURGERY

## 2022-01-14 PROCEDURE — 99203 OFFICE O/P NEW LOW 30 MIN: CPT | Performed by: ORTHOPAEDIC SURGERY

## 2022-01-14 RX ORDER — TRIAMCINOLONE ACETONIDE 40 MG/ML
40 INJECTION, SUSPENSION INTRA-ARTICULAR; INTRAMUSCULAR ONCE
Status: COMPLETED | OUTPATIENT
Start: 2022-01-14 | End: 2022-01-14

## 2022-01-14 RX ADMIN — TRIAMCINOLONE ACETONIDE 40 MG: 40 INJECTION, SUSPENSION INTRA-ARTICULAR; INTRAMUSCULAR at 14:11

## 2022-01-14 NOTE — PROGRESS NOTES
Chief Complaint   Patient presents with    Knee Pain     b/l knee pain for the past 7 years         HPI:    Patient is 63 y.o. male complaining chronic, atraumatic, insidious onset bilateral knee pain for 7 years. Patient did have previous knee arthroscopies performed He admits to stiffness, deep, aching pain, swelling, difficulty with stairs and ambulating far distances. He denies gross instability. Previous treatments include rest, ice, heat, NSAIDs, HEP without much relief. ROS:    Skin: (-) rash,(-) psoriasis,(-) eczema, (-)skin cancer. Neurologic: (-)numbness, (-)tingling, (-)headaches, (-) LOC. Cardiovascular: (-) Chest pain, (-) swelling in legs/feet, (-) SOB, (-) cramping in legs/feet with walking.     All other review of systems negative except stated above or in HPI      Past Medical History:   Diagnosis Date    Abdominal fibromatosis 2017    Atrial fibrillation ([facility] [address]     Hyperlipidemia     Hypertension     Osteoarthritis     knees    Sinusitis      Past Surgical History:   Procedure Laterality Date    COLONOSCOPY  04/11/2012    KNEE ARTHROSCOPY Bilateral 2009, 2010    left knee, right knee       Current Outpatient Medications:     verapamil (CALAN SR) 240 MG extended release tablet, Take 1 tablet by mouth daily, Disp: 30 tablet, Rfl: 3    loratadine (CLARITIN) 10 MG tablet, Take 1 tablet by mouth daily As needed for itching relief., Disp: 5 tablet, Rfl: 0    aspirin 325 MG EC tablet, Take 1 tablet by mouth daily, Disp: 90 tablet, Rfl: 3    potassium chloride (KLOR-CON M) 20 MEQ extended release tablet, Take 1.5 tablets  daily, Disp: 120 tablet, Rfl: 0    hydroCHLOROthiazide (HYDRODIURIL) 25 MG tablet, Take 1 tablet by mouth daily, Disp: 30 tablet, Rfl: 5    EPINEPHrine (EPIPEN 2-GUERITA) 0.3 MG/0.3ML SOAJ injection, Inject 0.3 mLs into the muscle once as needed (severe allergic reaction) Use as directed for allergic reaction, Disp: 1 each, Rfl: 0    famotidine (PEPCID) 20 MG tablet, Take 1 tablet by mouth 2 times daily for 5 days, Disp: 10 tablet, Rfl: 0  Allergies   Allergen Reactions    Apple Swelling     tongue swelling     Social History     Socioeconomic History    Marital status:      Spouse name: Not on file    Number of children: Not on file    Years of education: Not on file    Highest education level: Not on file   Occupational History    Not on file   Tobacco Use    Smoking status: Never Smoker    Smokeless tobacco: Never Used   Vaping Use    Vaping Use: Never used   Substance and Sexual Activity    Alcohol use: Yes     Comment: rare    Drug use: No    Sexual activity: Not Currently   Other Topics Concern    Not on file   Social History Narrative    Not on file     Social Determinants of Health     Financial Resource Strain: Low Risk     Difficulty of Paying Living Expenses: Not hard at all   Food Insecurity: No Food Insecurity    Worried About [address] in the Last Year: Never true    [address] in the Last Year: Never true   Transportation Needs:     Lack of Transportation (Medical): Not on file    Lack of Transportation (Non-Medical):  Not on file   Physical Activity:     Days of Exercise per Week: Not on file    Minutes of Exercise per Session: Not on file   Stress:     Feeling of Stress : Not on file   Social Connections:     Frequency of Communication with Friends and Family: Not on file    Frequency of Social Gatherings with Friends and Family: Not on file    Attends Adventism Services: Not on file    Active Member of Clubs or Organizations: Not on file    Attends Club or Organization Meetings: Not on file    Marital Status: Not on file   Intimate Partner Violence:     Fear of Current or Ex-Partner: Not on file    Emotionally Abused: Not on file    Physically Abused: Not on file    Sexually Abused: Not on file   Housing Stability:     Unable to Pay for Housing in the Last Year: Not on file    Number of [place] in the Last Year: Not on file    Unstable Housing in the Last Year: Not on file     Family History   Problem Relation Age of Onset    High Blood Pressure Mother     High Blood Pressure Father     No Known Problems Sister     No Known Problems Brother     No Known Problems Brother     No Known Problems Brother            Physical Exam:    Temp 98 °F (36.7 °C)   Ht 5' 7\" (1.702 m)   Wt 185 lb (83.9 kg)   BMI 28.98 kg/m²     GENERAL: alert, appears stated age, cooperative, no acute distress    HEENT: Head is normocephalic, atraumatic. PERRLA. SKIN: Clean, dry, intact. There no cellulitis or cutaneous lesions noted in the lower extremities    PULMONARY: breathing is regular and unlabored, no acute distress    CV: The bilateral upper and lower extremities are warm and well-perfused with brisk capillary refill. 2+ pulses UE and LE bilateral.     PSYCHIATRY: Pleasant mood, appropriate behavior, follows commands    NEURO: Sensation is intact distally with light touch with no alteration. Motor exam of the lower extremities show quadriceps, hamstrings, foot dorsiflexion and plantarflexion grossly intact 5/5. LYMPH: No lymphedema present distally in upper or lower extremity. MUSCULOSKELETAL:  Right Knee Exam:    mild effusion noted. No erythema/induration/fluctuance. Posterior medial joint line TTP. Stable to varus and valgus at 0 and 30 degrees of flexion. Negative Lachman's and posterior drawer. Negative patellar grind test and J sign. Compartments soft and compressible throughout leg. Active range of motion 0-120 with pain. Left Knee Exam:    mild effusion noted. No erythema/induration/fluctuance. Posterior medial joint line TTP. Stable to varus and valgus at 0 and 30 degrees of flexion. Negative Lachman's and posterior drawer. Negative patellar grind test and J sign. Compartments soft and compressible throughout leg. Active range of motion 0-120 with pain.      Imaging:  XR KNEE LEFT (MIN 4 VIEWS)    Result Date: 1/14/2022  EXAMINATION: FOUR XRAY VIEWS OF THE LEFT KNEE; FOUR XRAY VIEWS OF THE RIGHT KNEE 1/14/2022 12:16 pm COMPARISON: Bilateral knees 17 September 2009 HISTORY: ORDERING SYSTEM PROVIDED HISTORY: Pain in both knees, unspecified chronicity FINDINGS: Bilateral knees: Tricompartmental osteoarthritis with severe findings at the medial weight-bearing compartments and milder involvement at the other 2 compartments. Moderate bilateral knee joint effusions. No fracture or dislocation. Tricompartmental osteoarthritis at both knees with severe findings at the medial weight-bearing compartments. XR KNEE RIGHT (MIN 4 VIEWS)    Result Date: 1/14/2022  EXAMINATION: FOUR XRAY VIEWS OF THE LEFT KNEE; FOUR XRAY VIEWS OF THE RIGHT KNEE 1/14/2022 12:16 pm COMPARISON: Bilateral knees 17 September 2009 HISTORY: ORDERING SYSTEM PROVIDED HISTORY: Pain in both knees, unspecified chronicity FINDINGS: Bilateral knees: Tricompartmental osteoarthritis with severe findings at the medial weight-bearing compartments and milder involvement at the other 2 compartments. Moderate bilateral knee joint effusions. No fracture or dislocation. Tricompartmental osteoarthritis at both knees with severe findings at the medial weight-bearing compartments. Chelsy Mason was seen today for knee pain. Diagnoses and all orders for this visit:    Primary osteoarthritis of right knee  -     [place] ARTHROCENTESIS ASPIR&/INJ MAJOR JT/BURSA W/O US    Primary osteoarthritis of left knee  -     [place] ARTHROCENTESIS ASPIR&/INJ MAJOR JT/BURSA W/O US    Other orders  -     triamcinolone acetonide (KENALOG-40) injection 40 mg  -     triamcinolone acetonide (KENALOG-40) injection 40 mg        Patient seen and examined. X-rays reviewed. Natural history and course discussed with patient. Treatment options discussed with patient in detail including risks and benefits.   Patient should do well with conservative management at this time.    Procedure Note Knee Cortisone Injection    The Bilateral knee was identified as the injection site. The risk and benefits of a cortisone injection were explained and the patient consented to the injection. Under sterile conditions, the knee was injected with a mixture of 40 mg of Kenalog and Marcaine without complication. A sterile bandage was applied.       Maia Carlton, DO  1/14/22

## 2022-01-14 NOTE — TELEPHONE ENCOUNTER
The patient is summoned to jury duty and would like to be excused  He has heart appointments the week they want him to go to jury duty.   They are requesting a letter from his primary doctor faxed to [address] [place] [address] Mo Wilkinson office :    [phone]    He is undergoing cardiac testing  And has several health issures and physically unable to perform jury duties  He is unvaccinated also and does not want to go in to a court room full of unknown people

## 2022-02-01 ENCOUNTER — OFFICE VISIT (OUTPATIENT)
Dept: CARDIOLOGY CLINIC | Age: 62
End: 2022-02-01
Payer: COMMERCIAL

## 2022-02-01 ENCOUNTER — TELEPHONE (OUTPATIENT)
Dept: CARDIOLOGY CLINIC | Age: 62
End: 2022-02-01

## 2022-02-01 VITALS
BODY MASS INDEX: 30.61 KG/M2 | DIASTOLIC BLOOD PRESSURE: 88 MMHG | SYSTOLIC BLOOD PRESSURE: 132 MMHG | RESPIRATION RATE: 14 BRPM | HEIGHT: 67 IN | HEART RATE: 96 BPM | WEIGHT: 195 LBS

## 2022-02-01 DIAGNOSIS — I48.91 ATRIAL FIBRILLATION, UNSPECIFIED TYPE (HCC): Primary | ICD-10-CM

## 2022-02-01 PROCEDURE — 93000 ELECTROCARDIOGRAM COMPLETE: CPT | Performed by: INTERNAL MEDICINE

## 2022-02-01 PROCEDURE — 99215 OFFICE O/P EST HI 40 MIN: CPT | Performed by: INTERNAL MEDICINE

## 2022-02-01 NOTE — TELEPHONE ENCOUNTER
Received a call from Nba Monge at [facility]. Patient's monitor will be uploaded to site ASA. Patient did have AF with a rate of 204 bpm for 1 minute in duration. He was 100% AF burden for entire 14 days of monitor. Maximum HR was 235 bpm.  Patient did have 1 episode of VT.   Patient does have OV scheduled for 2/1/2022 at 11:15 am.

## 2022-02-01 NOTE — PROGRESS NOTES
OUTPATIENT CARDIOLOGY FOLLOW-UP    Name: Amada Tang    Age: 63 y.o. Primary Care Physician: Nathalia Stewart MD    Date of Service: 2/1/2022    Chief Complaint:   Chief Complaint   Patient presents with    Atrial Fibrillation    Palpitations        Interim History: Former patient of  [facility], also seen by Dr. Amairlys Whitley once for EP and did not follow-up. Has history of paroxysmal atrial fibrillation appears to become persistent. Just wore a 14-day monitor at my recommendation after was contacted by internal medicine resident, which showed a 100% A. fib burden. He is not anticoagulated due to \"low stroke risk\" and is maintained on full-dose aspirin. Today he states he feels be palpitations and is aware of being out of rhythm, but denies chest pain, shortness of breath, exercise intolerance. An echo last May showed normal LV function. Review of Systems:   Negative except as described above    Past Medical History:  Past Medical History:   Diagnosis Date    Abdominal fibromatosis 2017    Atrial fibrillation ([address]     Hyperlipidemia     Hypertension     Osteoarthritis     knees    Sinusitis        Past Surgical History:  Past Surgical History:   Procedure Laterality Date    COLONOSCOPY  04/11/2012    KNEE ARTHROSCOPY Bilateral 2009, 2010    left knee, right knee       Family History:  Family History   Problem Relation Age of Onset    High Blood Pressure Mother     High Blood Pressure Father     No Known Problems Sister     No Known Problems Brother     No Known Problems Brother     No Known Problems Brother        Social History:  Social History     Tobacco Use    Smoking status: Never Smoker    Smokeless tobacco: Never Used   Vaping Use    Vaping Use: Never used   Substance Use Topics    Alcohol use: Yes     Comment: rare    Drug use: No        Allergies:   Allergies   Allergen Reactions    Apple Swelling     tongue swelling       Current Medications:    Current Outpatient Medications:     rivaroxaban (XARELTO) 20 MG TABS tablet, Take 1 tablet by mouth daily (with breakfast), Disp: 30 tablet, Rfl: 3    verapamil (CALAN SR) 240 MG extended release tablet, Take 1 tablet by mouth daily, Disp: 30 tablet, Rfl: 3    potassium chloride (KLOR-CON M) 20 MEQ extended release tablet, Take 1.5 tablets  daily, Disp: 120 tablet, Rfl: 0    hydroCHLOROthiazide (HYDRODIURIL) 25 MG tablet, Take 1 tablet by mouth daily, Disp: 30 tablet, Rfl: 5    EPINEPHrine (EPIPEN 2-GUERITA) 0.3 MG/0.3ML SOAJ injection, Inject 0.3 mLs into the muscle once as needed (severe allergic reaction) Use as directed for allergic reaction, Disp: 1 each, Rfl: 0    famotidine (PEPCID) 20 MG tablet, Take 1 tablet by mouth 2 times daily for 5 days, Disp: 10 tablet, Rfl: 0    Physical Exam:  /88   Pulse 96   Resp 14   Ht 5' 7\" (1.702 m)   Wt 195 lb (88.5 kg)   BMI 30.54 kg/m²   Wt Readings from Last 3 Encounters:   02/01/22 195 lb (88.5 kg)   01/14/22 185 lb (83.9 kg)   01/12/22 192 lb (87.1 kg)     Appearance: Awake, alert and oriented x 3, no acute respiratory distress  Skin: Intact, no rash  Head: Normocephalic, atraumatic  Eyes: EOMI, no conjunctival erythema  ENMT: No pharyngeal erythema, MMM, no rhinorrhea  Neck: Supple, no elevated JVP, no carotid bruits  Lungs: Clear to auscultation bilaterally. No wheezes, rales, or rhonchi.   Cardiac: Irregular rhythm with a normal rate, +S1S2, no murmurs apparent  Abdomen: Soft, nontender, +bowel sounds  Extremities: Moves all extremities x 4, no lower extremity edema  Neurologic: No focal motor deficits apparent, normal mood and affect, alert and oriented x 3  Peripheral Pulses: Intact posterior tibial pulses bilaterally    Laboratory Tests:  Lab Results   Component Value Date    CREATININE 1.0 10/19/2021    BUN 13 10/19/2021     10/19/2021    K 4.1 10/19/2021    CL 99 10/19/2021    CO2 28 10/19/2021     Lab Results   Component Value Date    MG 2.2 10/19/2021     Lab for stress-induced   ischemia. Cardiac catheterization:     Orders Placed This Encounter   Procedures    EKG 12 lead        Requested Prescriptions     Signed Prescriptions Disp Refills    rivaroxaban (XARELTO) 20 MG TABS tablet 30 tablet 3     Sig: Take 1 tablet by mouth daily (with breakfast)        ASSESSMENT / PLAN:  1. Persistent atrial fibrillation. 100% burden on recent monitor. [id number] 1-2 (HTN, mild CAD). On full dose aspirin. Previously on apixaban, reported epistaxis  2. Mild CAD, coronary calcification noted on prior chest CT  3. History of pericardial effusion 2018  4. Hypertension  5. Hyperlipidemia    Recommendations:  Given age and no prior attempts at rhythm control, recommend at this time pursuing rhythm control strategy. · Start rivaroxaban 20 mg daily  · Discontinue aspirin  · Arrange ALBERTO cardioversion  · If has recurrence, back to EP for discussion of ablation versus AAD  · Continue verapamil for now  · Follow-up in 3 months or sooner if need arises    Risks and benefits of ALBERTO and cardioversion explained to patient including risk of esophageal perforation, CVA, failure to restore sinus rhythm, and recurrence of atrial arrhythmias. He understands and agrees to proceed. Greater than 40-minutes spent on this encounter    The patient's current medication list, allergies, problem list and results of all previously ordered testing were reviewed at today's visit.     Gavi Ma MD, [facility]

## 2022-02-07 ENCOUNTER — TELEPHONE (OUTPATIENT)
Dept: NON INVASIVE DIAGNOSTICS | Age: 62
End: 2022-02-07

## 2022-02-07 ENCOUNTER — TELEPHONE (OUTPATIENT)
Dept: CARDIOLOGY CLINIC | Age: 62
End: 2022-02-07

## 2022-02-07 NOTE — TELEPHONE ENCOUNTER
Medication: Xarelto 20mg  Sig: daily    Dx: atrial fibrillation I48.91    Provider: Mancil Hives    Pharmacy: [facility] [address] [place] [address] ph: [phone]    Pepe Greer pending

## 2022-02-07 NOTE — TELEPHONE ENCOUNTER
Xarelto 20mg daily was approved through Cover my Meds. Approved from 01/08/2022 to 02/07/2023. Authorization scanned into .       Case # [id number]

## 2022-02-08 NOTE — TELEPHONE ENCOUNTER
Generally recommend Yomi or Curtis Trejo. They all carry slight risk, but the benefit likely far outweighs the potential risk.

## 2022-02-09 NOTE — TELEPHONE ENCOUNTER
Contacted patient with recommendations per Dr. Jenny Cook. He verbalized understanding and will proceed with obtaining vaccine.

## 2022-02-10 DIAGNOSIS — Z01.818 PRE-OP TESTING: Primary | ICD-10-CM

## 2022-02-16 DIAGNOSIS — I48.91 ATRIAL FIBRILLATION, UNSPECIFIED TYPE (HCC): ICD-10-CM

## 2022-02-17 ENCOUNTER — TELEPHONE (OUTPATIENT)
Dept: FAMILY MEDICINE CLINIC | Age: 62
End: 2022-02-17

## 2022-02-17 DIAGNOSIS — T78.3XXD ANGIOEDEMA, SUBSEQUENT ENCOUNTER: Primary | ICD-10-CM

## 2022-02-17 NOTE — TELEPHONE ENCOUNTER
Can you please do a new referral for Jorge to Dr. Frannie Perez. He is wanting to get the skin testing done and [facility] has no doctors.     Please advise    Thank you

## 2022-02-25 DIAGNOSIS — Z01.818 PRE-OP TESTING: ICD-10-CM

## 2022-02-27 LAB
SARS-COV-2: NOT DETECTED
SOURCE: NORMAL

## 2022-02-28 ENCOUNTER — IMMUNIZATION (OUTPATIENT)
Dept: PRIMARY CARE CLINIC | Age: 62
End: 2022-02-28
Payer: COMMERCIAL

## 2022-02-28 ENCOUNTER — TELEPHONE (OUTPATIENT)
Dept: NON INVASIVE DIAGNOSTICS | Age: 62
End: 2022-02-28

## 2022-02-28 ENCOUNTER — ANESTHESIA EVENT (OUTPATIENT)
Dept: CARDIAC CATH/INVASIVE PROCEDURES | Age: 62
End: 2022-02-28

## 2022-02-28 PROCEDURE — 0011A COVID-19, MODERNA PRIMARY SERIES VACCINE 100MCG/0.5ML DOSE: CPT | Performed by: NURSE PRACTITIONER

## 2022-02-28 PROCEDURE — 91301 COVID-19, MODERNA PRIMARY SERIES VACCINE 100MCG/0.5ML DOSE: CPT | Performed by: NURSE PRACTITIONER

## 2022-02-28 NOTE — TELEPHONE ENCOUNTER
Reminded patient of scheduled procedure on 3/1. Instructions given and COVID questionnaire completed.

## 2022-03-01 ENCOUNTER — TELEPHONE (OUTPATIENT)
Dept: NON INVASIVE DIAGNOSTICS | Age: 62
End: 2022-03-01

## 2022-03-01 ENCOUNTER — ANESTHESIA (OUTPATIENT)
Dept: CARDIAC CATH/INVASIVE PROCEDURES | Age: 62
End: 2022-03-01

## 2022-03-01 ENCOUNTER — HOSPITAL ENCOUNTER (OUTPATIENT)
Dept: CARDIAC CATH/INVASIVE PROCEDURES | Age: 62
Discharge: HOME | End: 2022-03-01
Payer: COMMERCIAL

## 2022-03-01 VITALS
HEART RATE: 91 BPM | OXYGEN SATURATION: 96 % | SYSTOLIC BLOOD PRESSURE: 137 MMHG | RESPIRATION RATE: 18 BRPM | TEMPERATURE: 97.5 F | HEIGHT: 67 IN | BODY MASS INDEX: 28.25 KG/M2 | WEIGHT: 180 LBS | DIASTOLIC BLOOD PRESSURE: 93 MMHG

## 2022-03-01 VITALS
OXYGEN SATURATION: 98 % | SYSTOLIC BLOOD PRESSURE: 107 MMHG | DIASTOLIC BLOOD PRESSURE: 65 MMHG | RESPIRATION RATE: 24 BRPM

## 2022-03-01 LAB
LV EF: 63 %
LVEF MODALITY: NORMAL

## 2022-03-01 PROCEDURE — 3700000001 HC ADD 15 MINUTES (ANESTHESIA)

## 2022-03-01 PROCEDURE — 2709999900 HC NON-CHARGEABLE SUPPLY

## 2022-03-01 PROCEDURE — 2580000003 HC RX 258

## 2022-03-01 PROCEDURE — 3700000000 HC ANESTHESIA ATTENDED CARE

## 2022-03-01 PROCEDURE — 92960 CARDIOVERSION ELECTRIC EXT: CPT

## 2022-03-01 PROCEDURE — 93312 ECHO TRANSESOPHAGEAL: CPT

## 2022-03-01 PROCEDURE — 6360000002 HC RX W HCPCS

## 2022-03-01 PROCEDURE — 93325 DOPPLER ECHO COLOR FLOW MAPG: CPT

## 2022-03-01 PROCEDURE — 93321 DOPPLER ECHO F-UP/LMTD STD: CPT

## 2022-03-01 RX ORDER — PROPOFOL 10 MG/ML
INJECTION, EMULSION INTRAVENOUS PRN
Status: DISCONTINUED | OUTPATIENT
Start: 2022-03-01 | End: 2022-03-01 | Stop reason: SDUPTHER

## 2022-03-01 RX ORDER — SODIUM CHLORIDE 9 MG/ML
INJECTION, SOLUTION INTRAVENOUS CONTINUOUS PRN
Status: DISCONTINUED | OUTPATIENT
Start: 2022-03-01 | End: 2022-03-01 | Stop reason: SDUPTHER

## 2022-03-01 RX ADMIN — PROPOFOL 250 MG: 10 INJECTION, EMULSION INTRAVENOUS at 08:59

## 2022-03-01 RX ADMIN — SODIUM CHLORIDE: 9 INJECTION, SOLUTION INTRAVENOUS at 08:45

## 2022-03-01 ASSESSMENT — LIFESTYLE VARIABLES: SMOKING_STATUS: 0

## 2022-03-01 NOTE — TELEPHONE ENCOUNTER
----- Message from [address] [place] [address] sent at 3/1/2022  9:29 AM EST -----  Patient seen by Dr. Angelo Brown on 3/9/21. Do you need me to place a new referral?  ----- Message -----  From: Roosevelt Rich MD  Sent: 3/1/2022   9:15 AM EST  To: Mary Nascimento    Cardioversion unsuccessful. Refer back to EP to discuss ablation vs AAD.

## 2022-03-01 NOTE — PROCEDURES
PROCEDURE NOTE    Attending Cardiologist: Brandee Anaya MD    Date of Service: 3/1/2022    Procedure: Transesophageal Echocardiogram and Direct Current Cardioversion    Indication: Persistent atrial fibrillation    Anticoagulant: Rivaroxaban    ALBERTO: No left atrial appendage thrombus    Antiarrhythmic drug(s): Verapamil    Description of procedure:  Patient presented in a fasting and well hydrated state. Informed consent obtained from patient. Risks, benefits and alternatives to ALBERTO and DC cardioversion were explained to the patient in detail, and the patient acknowledged understanding. Cardiac rhythm, arterial oxygen saturation, and blood pressure were continuously monitored. Deep sedation with propofol administered by the Department of Anesthesiology. ALBERTO was performed and there was no evidence of LA or SHEFALI thrombus. See full ALBERTO report in Epic. After removal of the probe and verification of adequate sedation, direct current cardioversion was performed as described:    Patch placement: Anterior/posterior  Energy: 200, 300, 360 Joules, synchronized  Number of shocks: 3  Outcome: Several seconds of sinus rhythm after each shock, ultimately remained in AF  Complications: None    Impression:  Unsuccessful ALBERTO-guided DC cardioversion of attempt of atrial fibrillation. Plan: Refer back to EP for ablation vs AAD.     Brandee Anaya MD, [facility] Cardiology

## 2022-03-01 NOTE — ANESTHESIA POSTPROCEDURE EVALUATION
Department of Anesthesiology  Postprocedure Note    Patient: Melanie Greer  MRN: [id number]  YOB: 1960  Date of evaluation: 3/1/2022  Time:  1:45 PM     Procedure Summary     Date: 03/01/22 Room / Location: [facility] CATH LAB; [facility] ECHO    Anesthesia Start: 8510 Anesthesia Stop: 1293    Procedure: SEY ALBERTO CARDIOVERSION W/ ANES Diagnosis: Unspecified atrial fibrillation    Scheduled Providers: ELIZABETH Barraza - PREM; Gabriel Mcnamara MD Responsible Provider: Gabriel Mcnamara MD    Anesthesia Type: MAC ASA Status: 3          Anesthesia Type: MAC    Rell Phase I:      Rell Phase II:      Last vitals: Reviewed and per EMR flowsheets.        Anesthesia Post Evaluation    Patient location during evaluation: PACU  Patient participation: complete - patient participated  Level of consciousness: awake and alert  Airway patency: patent  Nausea & Vomiting: no nausea and no vomiting  Complications: no  Cardiovascular status: hemodynamically stable and blood pressure returned to baseline  Respiratory status: acceptable  Hydration status: euvolemic

## 2022-03-01 NOTE — H&P
Here for ALBERTO cardioversion, patient known to me. Office note from 2/1/2022 reviewed. No changes. Rivaroxaban started day of office visit. Risks and benefits of ALBERTO cardioversion again reviewed as documented.     Impression: Persistent atrial fibrillation  Plan: ALBERTO guided cardioversion    Evelyn Benoit MD

## 2022-03-01 NOTE — ANESTHESIA PRE PROCEDURE
facility-administered medications for this encounter. Allergies: Allergies   Allergen Reactions    Apple Swelling     tongue swelling       Problem List:    Patient Active Problem List   Diagnosis Code    Atypical chest pain R07.89    Hypertension I10    Hyperlipidemia E78.5    Osteoarthritis of both knees M17.0    Atrial fibrillation ([address] I48.91    Pain in both knees M25.561, M25.562    Pleuritic chest pain R07.81    Pericarditis I31.9    Pericardial effusion I31.3    Chronic anticoagulation Z79.01    Pericarditis, acute I30.9       Past Medical History:        Diagnosis Date    Abdominal fibromatosis 2017    Atrial fibrillation ([address]     Hyperlipidemia     Hypertension     Osteoarthritis     knees    Sinusitis        Past Surgical History:        Procedure Laterality Date    COLONOSCOPY  04/11/2012    KNEE ARTHROSCOPY Bilateral 2009, 2010    left knee, right knee       Social History:    Social History     Tobacco Use    Smoking status: Never Smoker    Smokeless tobacco: Never Used   Substance Use Topics    Alcohol use: Yes     Comment: rare                                Counseling given: Not Answered      Vital Signs (Current): There were no vitals filed for this visit.                                            BP Readings from Last 3 Encounters:   02/01/22 132/88   01/12/22 136/78   01/06/22 (!) 144/78       NPO Status:  > 8 hours                                                                                BMI:   Wt Readings from Last 3 Encounters:   02/01/22 195 lb (88.5 kg)   01/14/22 185 lb (83.9 kg)   01/12/22 192 lb (87.1 kg)     There is no height or weight on file to calculate BMI.    CBC:   Lab Results   Component Value Date    WBC 5.5 10/19/2021    RBC 4.93 10/19/2021    HGB 15.3 10/19/2021    HCT 42.0 10/19/2021    MCV 85.2 10/19/2021    RDW 12.8 10/19/2021     10/19/2021       CMP:   Lab Results   Component Value Date     10/19/2021    K 4.1 10/19/2021 K 3.8 2018    CL 99 10/19/2021    CO2 28 10/19/2021    BUN 13 10/19/2021    CREATININE 1.0 10/19/2021    GFRAA >60 10/19/2021    LABGLOM >60 10/19/2021    GLUCOSE 94 10/19/2021    GLUCOSE 86 2012    PROT 6.8 10/19/2021    CALCIUM 9.1 10/19/2021    BILITOT 0.6 10/19/2021    ALKPHOS 70 10/19/2021    AST 22 10/19/2021    ALT 17 10/19/2021       POC Tests: No results for input(s): POCGLU, POCNA, POCK, POCCL, POCBUN, POCHEMO, POCHCT in the last 72 hours. Coags:   Lab Results   Component Value Date    PROTIME 15.1 10/24/2019    INR 1.3 10/24/2019    APTT 32.3 2018       HCG (If Applicable): No results found for: PREGTESTUR, PREGSERUM, HCG, HCGQUANT     ABGs: No results found for: PHART, PO2ART, ZBY9BMR, WGX7LBM, BEART, U7VBMHVM     Type & Screen (If Applicable):  No results found for: LABABO, LABRH    Drug/Infectious Status (If Applicable):  Lab Results   Component Value Date    HEPCAB NON REACT 2012       COVID-19 Screening (If Applicable):   Lab Results   Component Value Date    COVID19 Not Detected 2022     EK2022  Atrial fibrillation  -irregular conduction   ABNORMAL RHYTHM    ECHO: 2021  Findings      Left Ventricle   Normal left ventricle diastolic dimension. Normal left ventricle wall thickness. No wall motion abnormalities. Normal diastolic function. Ejection fraction is visually estimated at 55-65%. Right Ventricle   Normal right ventricular size and function. Left Atrium   Normal left atrium. Interatrial septum appears intact. Right Atrium   Normal right atrium size. Mitral Valve   Structurally normal mitral valve. Tricuspid Valve   The tricuspid valve appears structurally normal.      Aortic Valve   Structurally normal aortic valve. Pulmonic Valve   Pulmonic valve is structurally normal.      Pericardial Effusion      No evidence of pericardial effusion. Aorta   The aorta is within normal limits.       Conclusions Summary   Normal diastolic function. Ejection fraction is visually estimated at 55-65%. Normal left atrium. Structurally normal mitral valve. Structurally normal aortic valve. Signature      ----------------------------------------------------------------   Electronically signed by John Trotter MD(Interpreting   physician) on 05/12/2021 06:29 AM      Cardiac Stress Test: 6/15/2020  Impression:    1. Exercise EKG was negative. 2. The patient experienced no chest pain with exercise. 3. Exercise capacity was below average. 4. Low risk general exercise treadmill test.       Anesthesia Evaluation  Patient summary reviewed and Nursing notes reviewed no history of anesthetic complications:   Airway: Mallampati: III  TM distance: >3 FB   Neck ROM: full  Mouth opening: > = 3 FB Dental:      Comment: Patient denies any chipped, loose, or missing teeth     Pulmonary: breath sounds clear to auscultation      (-) not a current smoker          Patient did not smoke on day of surgery. ROS comment: Some chest discomfort on the lower left side    Cardiovascular:  Exercise tolerance: poor (<4 METS),   (+) hypertension:, dysrhythmias: atrial fibrillation, hyperlipidemia      ECG reviewed  Rhythm: irregular  Rate: abnormal  Echocardiogram reviewed  Stress test reviewed  Cleared by cardiology     Beta Blocker:  Not on Beta Blocker         Neuro/Psych:   Negative Neuro/Psych ROS              GI/Hepatic/Renal: Neg GI/Hepatic/Renal ROS            Endo/Other:    (+) : arthritis: OA., .                 Abdominal:             Vascular: negative vascular ROS. Other Findings:           Anesthesia Plan      MAC     ASA 3     (#20 Right AC)  Induction: intravenous. Plan discussed with attending and CRNA.                 Silvana Sun, RN   3/1/2022

## 2022-03-07 DIAGNOSIS — I10 ESSENTIAL HYPERTENSION: ICD-10-CM

## 2022-03-07 DIAGNOSIS — E87.6 HYPOKALEMIA: ICD-10-CM

## 2022-03-07 RX ORDER — HYDROCHLOROTHIAZIDE 25 MG/1
25 TABLET ORAL DAILY
Qty: 30 TABLET | Refills: 5 | Status: SHIPPED
Start: 2022-03-07 | End: 2022-07-05

## 2022-03-07 RX ORDER — VERAPAMIL HYDROCHLORIDE 240 MG/1
240 TABLET, FILM COATED, EXTENDED RELEASE ORAL DAILY
Qty: 30 TABLET | Refills: 3 | Status: SHIPPED
Start: 2022-03-07 | End: 2022-05-09 | Stop reason: SDUPTHER

## 2022-03-07 RX ORDER — POTASSIUM CHLORIDE 20 MEQ/1
TABLET, EXTENDED RELEASE ORAL
Qty: 120 TABLET | Refills: 0 | Status: SHIPPED
Start: 2022-03-07 | End: 2022-05-02 | Stop reason: SDUPTHER

## 2022-03-16 NOTE — PROGRESS NOTES
[facility] CARDIOLOGY  CARDIAC ELECTROPHYSIOLOGY DEPARTMENT/DIVISION OF CARDIOLOGY  Outpatient Progress Report  PATIENT: Enriqueta Rosario  MEDICAL RECORD NUMBER: [id number]  DATE OF SERVICE:  3/16/2022  ATTENDING ELECTROPHYSIOLOGIST:  Nicole Purvis D.O.  REFERRING PHYSICIAN: No ref. provider found and Joanna Stinson MD  CHIEF COMPLAINT: AF    Subjective: Enriqueta Rosario is a 63 y.o. male with a history of nonvalvular longstanding persistent atrial fibrillation status post DCCV (3/2022-failed), HTN, pericarditis (2018), and OA. Is managed by Dr. Debbie Dwyer with HCTZ 25 mg daily, Xarelto 20 mg daily, and verapamil 240 mg daily. in 3/2021, he was initially diagnosed with atrial fibrillation. He was referred to Dr. Ita Whipple who reviewed antiarrhythmic and ablation therapies with patient. Patient was to follow-up with Dr. Ita Whipple but did not. In 2/2022, a event monitor reported 100% AF burden with episodes of rapid ventricular rate. In 3/2022, he had DCCV with Dr. Debbie Dwyer which failed to restore sinus rhythm. He presents today, 3/17/2022; to transfer EP care from Dr. Ita Whipple to my office. Patient describes fatigue and dyspnea since being in A. fib. He denies any other complaints at this time. Prior cardiac testing:   · ALBERTO: 3/1/2022: LVEF 60-65%, mildly dilated aortic root, bi atrial dilation. · TTE: 5/11/2021: LVEF 55-65%, normal left atrium  · Exercise Stress Study (non-nuclear): 6/15/2020: patient exercised using a Kal protocol, completing 6:10 minutes and reaching an estimated work load of 7.0 metabolic equivalents. Resting HR was 72. Peak exercise heart rate was 136 ( 84% of maximum predicted heart rate for age). Baseline /78.  Peak exercise BP     Past Medical History:   Diagnosis Date    Abdominal fibromatosis 2017    Atrial fibrillation ([address]     Hyperlipidemia     Hypertension     Osteoarthritis     knees    Sinusitis      Past Surgical History:   Procedure change and rash. Neurological: Negative for syncope and light-headedness. Psychiatric/Behavioral: Negative for confusion and agitation. The patient is not nervous/anxious. Heme: no bleeding disorders, no melena or hematochezia  All other review of systems are negative     PHYSICAL EXAM:  VS: /88 (Site: Left Upper Arm, Position: Sitting, Cuff Size: Medium Adult)   Pulse 66   Resp 18   Ht 5' 7\" (1.702 m)   Wt 187 lb (84.8 kg)   BMI 29.29 kg/m²    Constitutional : Well-developed, no acute distress, well groomed  Eyes: conjunctivae normal, no xanthelasma   Ears, Nose, Throat: oral mucosa moist, no cyanosis   Neck: supple, no JVD, no bruits, no thyromegaly   CV: IRIR,  no murmurs, rubs, or gallops. PMI is nondisplaced, Peripheral pulses normal including carotid auscultation, no noted aortic bruit, bilateral femoral and pedal pulses are normal in quality  Lungs: clear to auscultation bilaterally, normal respiratory effort without used of accessory muscles, no wheezes  Abdomen: soft, non-tender, bowel sounds present, no masses or hepatomegaly   Extremities: no digital clubbing, no edema   Skin: warm, no rashes   Neuro/Psych: A&O x 3, normal mood and affect    Cardiac testing done today:   · ECG: 3/17/2022: AF with ventricular rate of 66 bpm.     Assessment/plan:  1. Nonvalvular longstanding persistent AF status post DCCV (3/2022-failed)  -Initially diagnosed in 2021.  -Chads vas score = 1 (HTN). Recommend [address] in males with score greater than or equal to 1. DOAC preferred.  -Continue Xarelto 20 mg daily. He needs annual CBC and CMP. -Symptomatic. Recommend rhythm control strategy. I reviewed options of antiarrhythmic and ablation. He desires flecainide 50 mg twice daily and repeat direct-current cardioversion with possible ablation in the future.   Recommend cardiac CTA to evaluate cardiac structure and function prior to ablation.    -Modifiable risk factors:  --obesity: BMI goal less than 27  --ROXANN: Patient denies any sleep apnea symptoms.  --HTN: BP goal less than 130/80. --Alcohol: Continue to avoid alcohol  -Patient follow-up with my office in 2 months. I spent a total of 40 minutes reviewing previous notes, test results, and face to face with the patient discussing the diagnosis and importance of compliance with the treatment plan as well as documenting on the day of the visit. Time of the day of service includes:  · Preparing to see the patient (eg. Review of the medical record, such as tests). · Obtaining and/or reviewing separately obtained history. · Ordering prescription medications, tests, and/or procedures. · Communicating results to the patient/family/caregiver. · Counseling/educating the patient/family/caregiver. · Documenting clinical information in the patients electronic record. · Coordination of care for the patient. · Performing a medical appropriate exam and/or evaluation. Thank you for allowing me to participate in your patient's care. Please call me if there are any questions. Rhonda Cortez D.O.   Cardiac Electrophysiology  [facility]  [facility]) Physicians    CC: Alice Au MD

## 2022-03-17 ENCOUNTER — OFFICE VISIT (OUTPATIENT)
Dept: NON INVASIVE DIAGNOSTICS | Age: 62
End: 2022-03-17
Payer: COMMERCIAL

## 2022-03-17 VITALS
HEART RATE: 66 BPM | BODY MASS INDEX: 29.35 KG/M2 | RESPIRATION RATE: 18 BRPM | WEIGHT: 187 LBS | SYSTOLIC BLOOD PRESSURE: 138 MMHG | DIASTOLIC BLOOD PRESSURE: 88 MMHG | HEIGHT: 67 IN

## 2022-03-17 DIAGNOSIS — I48.91 ATRIAL FIBRILLATION, UNSPECIFIED TYPE (HCC): Primary | ICD-10-CM

## 2022-03-17 PROCEDURE — 93000 ELECTROCARDIOGRAM COMPLETE: CPT | Performed by: STUDENT IN AN ORGANIZED HEALTH CARE EDUCATION/TRAINING PROGRAM

## 2022-03-17 PROCEDURE — 99215 OFFICE O/P EST HI 40 MIN: CPT | Performed by: STUDENT IN AN ORGANIZED HEALTH CARE EDUCATION/TRAINING PROGRAM

## 2022-03-17 RX ORDER — FLECAINIDE ACETATE 50 MG/1
50 TABLET ORAL 2 TIMES DAILY
Qty: 60 TABLET | Refills: 6 | Status: SHIPPED
Start: 2022-03-17 | End: 2022-05-09

## 2022-03-17 NOTE — PATIENT INSTRUCTIONS
Start flecainide 50 mg tablet, 1 tablet every 12 hours. You will be schedule for cardioversion in ~2 weeks. Follow-up with Dr Aaron Montejo office in 2 months.
EOAE (evoked otoacoustic emission)

## 2022-04-04 ENCOUNTER — HOSPITAL ENCOUNTER (OUTPATIENT)
Age: 62
Discharge: HOME | End: 2022-04-04
Payer: COMMERCIAL

## 2022-04-04 ENCOUNTER — HOSPITAL ENCOUNTER (OUTPATIENT)
Dept: CT IMAGING | Age: 62
Discharge: HOME | End: 2022-04-06
Payer: COMMERCIAL

## 2022-04-04 DIAGNOSIS — I48.91 ATRIAL FIBRILLATION, UNSPECIFIED TYPE (HCC): Primary | ICD-10-CM

## 2022-04-04 DIAGNOSIS — I48.91 ATRIAL FIBRILLATION, UNSPECIFIED TYPE (HCC): ICD-10-CM

## 2022-04-04 LAB
ALBUMIN SERPL-MCNC: 4.4 G/DL (ref 3.5–5.2)
ALP BLD-CCNC: 59 U/L (ref 40–129)
ALT SERPL-CCNC: 14 U/L (ref 0–40)
ANION GAP SERPL CALCULATED.3IONS-SCNC: 8 MMOL/L (ref 7–16)
AST SERPL-CCNC: 16 U/L (ref 0–39)
BILIRUB SERPL-MCNC: 0.7 MG/DL (ref 0–1.2)
BUN BLDV-MCNC: 15 MG/DL (ref 6–23)
CALCIUM SERPL-MCNC: 9.4 MG/DL (ref 8.6–10.2)
CHLORIDE BLD-SCNC: 101 MMOL/L (ref 98–107)
CO2: 28 MMOL/L (ref 22–29)
CREAT SERPL-MCNC: 1.2 MG/DL (ref 0.7–1.2)
GFR AFRICAN AMERICAN: >60
GFR NON-AFRICAN AMERICAN: >60 ML/MIN/1.73
GLUCOSE BLD-MCNC: 110 MG/DL (ref 74–99)
POTASSIUM SERPL-SCNC: 4.2 MMOL/L (ref 3.5–5)
SODIUM BLD-SCNC: 137 MMOL/L (ref 132–146)
TOTAL PROTEIN: 7.3 G/DL (ref 6.4–8.3)

## 2022-04-04 PROCEDURE — 80053 COMPREHEN METABOLIC PANEL: CPT

## 2022-04-04 PROCEDURE — 75572 CT HRT W/3D IMAGE: CPT

## 2022-04-04 PROCEDURE — 36415 COLL VENOUS BLD VENIPUNCTURE: CPT

## 2022-04-04 PROCEDURE — 6360000004 HC RX CONTRAST MEDICATION: Performed by: RADIOLOGY

## 2022-04-04 RX ADMIN — IOPAMIDOL 100 ML: 755 INJECTION, SOLUTION INTRAVENOUS at 15:59

## 2022-04-13 ENCOUNTER — TELEPHONE (OUTPATIENT)
Dept: NON INVASIVE DIAGNOSTICS | Age: 62
End: 2022-04-13

## 2022-04-13 NOTE — TELEPHONE ENCOUNTER
Patient called the office stating that he researched the potential side effects of flecainide, and wishes to defer starting the AAD therapy. He is scheduled for a DCCV on 4/18/2022 and wishes just to have the DCCV, and is aware of the potential early reoccurrence of AF post DCCV without AAD therapy. He is s/p failed DCCV in March 2022. He states he would consider a catheter ablation in the future but he would just feel more comfortable with a  DCCV only. Will forward to Dr. Amy Casey.

## 2022-04-14 NOTE — TELEPHONE ENCOUNTER
Spoke with patient let him know we will put him on a list to schedule with TB to discuss ablation, and cancel upcoming CV. Patient verbalized understanding.

## 2022-05-02 DIAGNOSIS — I10 ESSENTIAL HYPERTENSION: ICD-10-CM

## 2022-05-02 DIAGNOSIS — E87.6 HYPOKALEMIA: ICD-10-CM

## 2022-05-02 RX ORDER — POTASSIUM CHLORIDE 20 MEQ/1
TABLET, EXTENDED RELEASE ORAL
Qty: 45 TABLET | Refills: 0 | Status: SHIPPED
Start: 2022-05-02 | End: 2022-05-09 | Stop reason: SDUPTHER

## 2022-05-02 RX ORDER — VERAPAMIL HYDROCHLORIDE 240 MG/1
240 TABLET, FILM COATED, EXTENDED RELEASE ORAL DAILY
Qty: 30 TABLET | Refills: 0 | OUTPATIENT
Start: 2022-05-02

## 2022-05-02 NOTE — TELEPHONE ENCOUNTER
Last Appointment:  1/12/2022  Future Appointments   Date Time Provider Weston Moran   5/27/2022  2:30 PM [facility]

## 2022-05-09 ENCOUNTER — OFFICE VISIT (OUTPATIENT)
Dept: FAMILY MEDICINE CLINIC | Age: 62
End: 2022-05-09
Payer: COMMERCIAL

## 2022-05-09 VITALS
HEART RATE: 62 BPM | HEIGHT: 67 IN | OXYGEN SATURATION: 98 % | DIASTOLIC BLOOD PRESSURE: 74 MMHG | WEIGHT: 187 LBS | BODY MASS INDEX: 29.35 KG/M2 | TEMPERATURE: 97.9 F | SYSTOLIC BLOOD PRESSURE: 126 MMHG | RESPIRATION RATE: 16 BRPM

## 2022-05-09 DIAGNOSIS — I10 ESSENTIAL HYPERTENSION: ICD-10-CM

## 2022-05-09 DIAGNOSIS — I48.91 ATRIAL FIBRILLATION, UNSPECIFIED TYPE (HCC): Primary | ICD-10-CM

## 2022-05-09 DIAGNOSIS — E87.6 HYPOKALEMIA: ICD-10-CM

## 2022-05-09 PROCEDURE — 99213 OFFICE O/P EST LOW 20 MIN: CPT | Performed by: STUDENT IN AN ORGANIZED HEALTH CARE EDUCATION/TRAINING PROGRAM

## 2022-05-09 PROCEDURE — 99212 OFFICE O/P EST SF 10 MIN: CPT | Performed by: STUDENT IN AN ORGANIZED HEALTH CARE EDUCATION/TRAINING PROGRAM

## 2022-05-09 RX ORDER — POTASSIUM CHLORIDE 20 MEQ/1
TABLET, EXTENDED RELEASE ORAL
Qty: 45 TABLET | Refills: 0 | Status: SHIPPED
Start: 2022-05-09 | End: 2022-07-05

## 2022-05-09 RX ORDER — VERAPAMIL HYDROCHLORIDE 240 MG/1
240 TABLET, FILM COATED, EXTENDED RELEASE ORAL DAILY
Qty: 30 TABLET | Refills: 3 | Status: SHIPPED
Start: 2022-05-09 | End: 2022-08-18 | Stop reason: SDUPTHER

## 2022-05-09 ASSESSMENT — ENCOUNTER SYMPTOMS
COUGH: 0
ABDOMINAL PAIN: 0
SHORTNESS OF BREATH: 0
VOMITING: 0
NAUSEA: 0

## 2022-05-09 ASSESSMENT — LIFESTYLE VARIABLES: HOW OFTEN DO YOU HAVE A DRINK CONTAINING ALCOHOL: NEVER

## 2022-05-09 NOTE — PROGRESS NOTES
[facility] RESIDENCY PROGRAM  DATE OF VISIT : 2022    Patient : Keo Gonzalez   Age : 63 y.o.  : 1960   MRN : [id number]   _  Chief Complaint :   Chief Complaint   Patient presents with    Atrial Fibrillation    Hypertension       HPI : Keo Gonzalez is 63 y.o. male who presented to the clinic today for follow-up on chronic health problem and medication refill. Atrial fibrillation: Patient underwent cardioversion in 2022 but was unsuccessful. According to EP plan was starting on flecainide and have repeat cardioversion in 2022. However patient declined for flecainide due to side effect that he googled. Patient is started on Xarelto in February. Patient underwent transesophageal echocardiogram and heart catheterization with no evidence of mural thrombosis. Currently patient reports his symptoms are better. He still has 1-2 episodes of intermittent palpitation but denies any flutter fully feeling. No dizziness, chest pain, shortness of breath, blurry vision or extremity weakness. Currently taking verapamil and Xarelto. Hypertension: Controlled with current regimen. Currently on hydrochlorothiazide 25. Hypokalemia: Patient is taking KCl 20 mEq daily. Reviewed previous lab which shows normal potassium for the last few months. Patient does not want to discontinue since he had low potassium approximately 2.5 and had a ER visit. Denies any chest pain, shortness of breath, leg swelling or muscle spasm.     Past Medical History :  Past Medical History:   Diagnosis Date    Abdominal fibromatosis 2017    Atrial fibrillation (HCC)     Hyperlipidemia     Hypertension     Osteoarthritis     knees    Sinusitis        Medication List :    Current Outpatient Medications   Medication Sig Dispense Refill    potassium chloride (KLOR-CON M) 20 MEQ extended release tablet Take 1.5 tablets  daily 45 tablet 0    verapamil (CALAN SR) 240 MG extended release tablet Take 1 tablet by mouth daily 30 tablet 3    hydroCHLOROthiazide (HYDRODIURIL) 25 MG tablet Take 1 tablet by mouth daily 30 tablet 5    rivaroxaban (XARELTO) 20 MG TABS tablet Take 1 tablet by mouth daily (with breakfast) 30 tablet 3    EPINEPHrine (EPIPEN 2-GUERITA) 0.3 MG/0.3ML SOAJ injection Inject 0.3 mLs into the muscle once as needed (severe allergic reaction) Use as directed for allergic reaction 1 each 0     No current facility-administered medications for this visit. Review of Systems :  Review of Systems   Constitutional: Negative for diaphoresis, fatigue and fever. HENT: Negative for congestion. Eyes: Negative for visual disturbance. Respiratory: Negative for cough and shortness of breath. Cardiovascular: Negative for chest pain, palpitations and leg swelling. Gastrointestinal: Negative for abdominal pain, nausea and vomiting. Genitourinary: Negative for difficulty urinating. Skin: Negative for rash. Neurological: Negative for dizziness, syncope, speech difficulty, weakness and light-headedness. Hematological: Does not bruise/bleed easily. Psychiatric/Behavioral: Negative for agitation. Physical Exam :    Vitals: /74   Pulse 62   Temp 97.9 °F (36.6 °C) (Temporal)   Resp 16   Ht 5' 7\" (1.702 m)   Wt 187 lb (84.8 kg)   SpO2 98%   BMI 29.29 kg/m²     General Appearance: Well developed, awake, alert, oriented, and in NAD   Neck: Supple, symmetrical, trachea midline. No JVD or carotid bruits. Chest wall/Lung: CTAB, respirations unlabored. No ronchi/wheezing/rales   Heart[de-identified] Irregularly irregular rhythm, rate controlled, normal S1 and S2, no murmurs, rubs or gallops. Abdomen: SNTND, +BSx4  Extremities: Extremities normal, atraumatic, no cyanosis, clubbing or edema. Skin: Skin color, texture, turgor normal, no rashes or lesions  Neurologic: Alert&Oriented x3. Motor and sensation grossly intact. Psychiatric: has a normal mood and affect.  Behavior is normal.       Assessment & Plan :    1. Essential hypertension  -Controlled with current medication  -Continue hydrochlorothiazide 25 mg daily    2. Hypokalemia  -Reviewed previous lab patient potassium has been normal for past few months however patient does not want to discontinue potassium since his potassium was very low and had a ER visit  - potassium chloride (KLOR-CON M) 20 MEQ extended release tablet; Take 1.5 tablets  daily  Dispense: 45 tablet; Refill: 0    3.  Atrial fibrillation, unspecified type ([address]  -Rate controlled but rhythm is irregular  -Status post cardioversion in March, unsuccessful  -As per cardiology note plan was starting flecainide and repeat cardioversion in April however patient declining from flecainide  -Has appointment with EP in the next few weeks  -Advised patient to continue verapamil and Xarelto        Follow-up in 3 months for chronic health problem  Mike Lance MD

## 2022-05-09 NOTE — PROGRESS NOTES
S: 63 y.o. male presents today for: Chronic f/u, refill meds    1) AFIB - s/p unsuccessful cardioversion. Pending cardio f/u. Reports improved palpitations. Tolerating Xarelto after agreeing to anticoag. 2) HTN - hctz. W/c.     3) Hypokalemia - previously low to ~2.5 and patient continues to take KCL daily. Doesn't want to d/c given very low K.       O: VS: /74   Pulse 62   Temp 97.9 °F (36.6 °C) (Temporal)   Resp 16   Ht 5' 7\" (1.702 m)   Wt 187 lb (84.8 kg)   SpO2 98%   BMI 29.29 kg/m²    Exam per resident MD.   Ty Anna, appropriate affect for mood  CV:  Irreg, irreg. no murmur  Resp: CTAB  Ext: No LE edema. Impression/Plan:   1) Afib - f/u cardio. Cont xarelto. 2) HTN - ont hctz 25mg po qday  3) Hypokalemia - Wants to continue. Attending Physician Statement  I have discussed the case, including pertinent history and exam findings with the resident. I agree with the documented assessment and plan.       Eugenie Boeck, MD

## 2022-05-27 ENCOUNTER — OFFICE VISIT (OUTPATIENT)
Dept: CARDIOLOGY CLINIC | Age: 62
End: 2022-05-27
Payer: COMMERCIAL

## 2022-05-27 VITALS
RESPIRATION RATE: 18 BRPM | BODY MASS INDEX: 29.32 KG/M2 | DIASTOLIC BLOOD PRESSURE: 74 MMHG | SYSTOLIC BLOOD PRESSURE: 132 MMHG | HEIGHT: 67 IN | HEART RATE: 85 BPM | WEIGHT: 186.8 LBS

## 2022-05-27 DIAGNOSIS — E78.5 HYPERLIPIDEMIA, UNSPECIFIED HYPERLIPIDEMIA TYPE: ICD-10-CM

## 2022-05-27 DIAGNOSIS — I10 PRIMARY HYPERTENSION: ICD-10-CM

## 2022-05-27 DIAGNOSIS — I48.91 ATRIAL FIBRILLATION, UNSPECIFIED TYPE (HCC): Primary | ICD-10-CM

## 2022-05-27 PROCEDURE — 99214 OFFICE O/P EST MOD 30 MIN: CPT | Performed by: INTERNAL MEDICINE

## 2022-05-27 PROCEDURE — 93000 ELECTROCARDIOGRAM COMPLETE: CPT | Performed by: INTERNAL MEDICINE

## 2022-05-27 NOTE — PROGRESS NOTES
OUTPATIENT CARDIOLOGY FOLLOW-UP    Name: Andrew Adams    Age: 63 y.o. Primary Care Physician: Maribel Callahan MD    Date of Service: 5/27/2022    Chief Complaint:   Chief Complaint   Patient presents with    Follow-up     3 month ov    Atrial Fibrillation        Interim History:   Here for follow-up regarding atrial fibrillation. Former patient of Dr. Minh Nam, also seen by Dr. Bianca Bryant once for EP 3/2021 and did not follow-up with him. Has history of paroxysmal atrial fibrillation initially noted in 2017 with minimal burden, however appears to have become persistent in February 2022. I attempted ALBERTO cardioversion in March 2022, but was unsuccessful after 3 shocks. I referred him to EP to discuss AAD versus ablation. He initially agreed to AAD and was discharged on flecainide, but after reading potential side effects he decided against it. He is now due to follow-up with EP discussed an ablation. Overall is doing okay. Notes occasional palpitations but improved in recent weeks. Denies chest pain, shortness of breath, syncope. No bleeding issues. States he avoids vitamin K while taking rivaroxaban even though the mechanism of action is nonvitamin K dependent pathway. He also has refused statin in the past because he has read slightly increased risk of diabetes.     Review of Systems:   Negative except as described above    Past Medical History:  Past Medical History:   Diagnosis Date    Abdominal fibromatosis 2017    Atrial fibrillation ([address]     Hyperlipidemia     Hypertension     Osteoarthritis     knees    Sinusitis        Past Surgical History:  Past Surgical History:   Procedure Laterality Date    CARDIOVERSION  03/01/2022    COLONOSCOPY  04/11/2012    KNEE ARTHROSCOPY Bilateral 2009, 2010    left knee, right knee    TRANSESOPHAGEAL ECHOCARDIOGRAM  03/01/2022       Family History:  Family History   Problem Relation Age of Onset    High Blood Pressure Mother     High Blood Pressure Father     No Known Problems Sister     No Known Problems Brother     No Known Problems Brother     No Known Problems Brother        Social History:  Social History     Tobacco Use    Smoking status: Never Smoker    Smokeless tobacco: Never Used   Vaping Use    Vaping Use: Never used   Substance Use Topics    Alcohol use: Yes     Comment: rare    Drug use: No        Allergies: Allergies   Allergen Reactions    Apple Swelling     tongue swelling       Current Medications:    Current Outpatient Medications:     potassium chloride (KLOR-CON M) 20 MEQ extended release tablet, Take 1.5 tablets  daily, Disp: 45 tablet, Rfl: 0    verapamil (CALAN SR) 240 MG extended release tablet, Take 1 tablet by mouth daily, Disp: 30 tablet, Rfl: 3    hydroCHLOROthiazide (HYDRODIURIL) 25 MG tablet, Take 1 tablet by mouth daily, Disp: 30 tablet, Rfl: 5    rivaroxaban (XARELTO) 20 MG TABS tablet, Take 1 tablet by mouth daily (with breakfast), Disp: 30 tablet, Rfl: 3    EPINEPHrine (EPIPEN 2-GUERITA) 0.3 MG/0.3ML SOAJ injection, Inject 0.3 mLs into the muscle once as needed (severe allergic reaction) Use as directed for allergic reaction, Disp: 1 each, Rfl: 0    Physical Exam:  /74   Pulse 85   Resp 18   Ht 5' 7\" (1.702 m)   Wt 186 lb 12.8 oz (84.7 kg)   BMI 29.26 kg/m²   Wt Readings from Last 3 Encounters:   05/27/22 186 lb 12.8 oz (84.7 kg)   05/09/22 187 lb (84.8 kg)   03/17/22 187 lb (84.8 kg)     Appearance: Awake, alert and oriented x 3, no acute respiratory distress  Skin: Intact, no rash  Head: Normocephalic, atraumatic  Eyes: EOMI, no conjunctival erythema  ENMT: No pharyngeal erythema, MMM, no rhinorrhea  Neck: Supple, no elevated JVP, no carotid bruits  Lungs: Clear to auscultation bilaterally. No wheezes, rales, or rhonchi.   Cardiac: Irregular rhythm with a normal rate, +S1S2, no murmurs apparent  Abdomen: Soft, nontender, +bowel sounds  Extremities: Moves all extremities x 4, no lower extremity edema  Neurologic: No focal motor deficits apparent, normal mood and affect, alert and oriented x 3  Peripheral Pulses: Intact posterior tibial pulses bilaterally    Laboratory Tests:  Lab Results   Component Value Date    CREATININE 1.2 2022    BUN 15 2022     2022    K 4.2 2022     2022    CO2 28 2022     Lab Results   Component Value Date    MG 2.2 10/19/2021     Lab Results   Component Value Date    WBC 5.5 10/19/2021    HGB 15.3 10/19/2021    HCT 42.0 10/19/2021    MCV 85.2 10/19/2021     10/19/2021     Lab Results   Component Value Date    ALT 14 2022    AST 16 2022    ALKPHOS 59 2022    BILITOT 0.7 2022     Lab Results   Component Value Date    CKTOTAL 327 (H) 2020    CKMB 1.4 2018    TROPONINI <0.01 2019    TROPONINI <0.01 2018    TROPONINI <0.01 2018     Lab Results   Component Value Date    INR 1.3 10/24/2019    INR 1.0 2018    PROTIME 15.1 (H) 10/24/2019    PROTIME 12.1 2018     Lab Results   Component Value Date    TSH 3.260 10/19/2021     Lab Results   Component Value Date    LABA1C 5.0 2020     No results found for: EAG  Lab Results   Component Value Date    CHOL 210 (H) 10/19/2021    CHOL 203 (H) 2021    CHOL 182 2020     Lab Results   Component Value Date    TRIG 37 10/19/2021    TRIG 46 2021    TRIG 41 2020     Lab Results   Component Value Date    HDL 47 10/19/2021    HDL 48 2021    HDL 56 2020     Lab Results   Component Value Date    LDLCALC 156 (H) 10/19/2021    LDLCALC 146 (H) 2021    LDLCALC 118 (H) 2020    LDLCHOLESTEROL 124 (H) 2013     Lab Results   Component Value Date    LABVLDL 7 10/19/2021    LABVLDL 9 2021    LABVLDL 8 2020     No results found for: CHOLHDLRATIO  No results for input(s): PROBNP in the last 72 hours. Cardiac Tests:  EC2022: Atrial fibrillation 85 beats minute.   Normal axis and intervals. Possible prior septal infarct. Echocardiogram:   ALBERTO 3/1/22   Summary   Ejection fraction is visually estimated at 60-65%. No evidence of thrombus within left atrium or appendage. Mildly enlarged right ventricle with normal function. Biatrial dilation. Mildly dilated aortic root. Transthoracic echo 5/11/2021     Summary   Normal diastolic function. Ejection fraction is visually estimated at 55-65%. Normal left atrium. Structurally normal mitral valve. Structurally normal aortic valve. Stress test:    Pharmacologic stress 1/2016  Impression   IMPRESSION:  Normal examination.  In particular, there is no   evidence of left ventricular myocardium at risk for stress-induced   ischemia. Cardiac catheterization:     14-day event monitor 1/2022  100% A. fib burden average heart rate 79 (35-\"235\")  1 reported NSVT, suspected to be aberrant conduction    Orders Placed This Encounter   Procedures    EKG 12 lead        Requested Prescriptions      No prescriptions requested or ordered in this encounter        ASSESSMENT / PLAN:  1. Persistent atrial fibrillation. Initially noted 2017 as paroxysmal.  100% burden on recent monitor. [id number] 1-2 (HTN, mild CAD). On rivaroxaban  a. ALBERTO/DCC attempt 3/2022, unsuccessful after 3 shocks  2. Asymptomatic CAD, coronary calcification noted on prior chest CT  3. History of pericardial effusion 2018  4. Mildly dilated aortic root on ALBERTO  5. Hypertension  6. Hyperlipidemia, refuses statin therapy. 10-year ASCVD risk 14.6% (intermediate)    Recommendations:  Stable from cardiac standpoint with rate controlled atrial fibrillation.     · Continue rivaroxaban 20 mg daily  · Continue to follow-up with EP for discussion of ablation versus AAD  · Explained to him the sooner his rhythm issue can be addressed, the higher likelihood of maintaining sinus he will have  · Will send a message to EP to ensure he has follow-up with them in the near future  · Continue verapamil for now  · Follow-up in 12 months or as needed as his primary issue is arrhythmia related    The patient's current medication list, allergies, problem list and results of all previously ordered testing were reviewed at today's visit.     Rosalba Hernandez MD, [address] [place] [address] Cardiology

## 2022-07-05 DIAGNOSIS — I10 ESSENTIAL HYPERTENSION: ICD-10-CM

## 2022-07-05 DIAGNOSIS — E87.6 HYPOKALEMIA: ICD-10-CM

## 2022-07-05 RX ORDER — POTASSIUM CHLORIDE 20 MEQ/1
TABLET, EXTENDED RELEASE ORAL
Qty: 45 TABLET | Refills: 0 | Status: SHIPPED
Start: 2022-07-05 | End: 2022-07-06 | Stop reason: SDUPTHER

## 2022-07-05 RX ORDER — HYDROCHLOROTHIAZIDE 25 MG/1
25 TABLET ORAL DAILY
Qty: 30 TABLET | Refills: 0 | Status: SHIPPED
Start: 2022-07-05 | End: 2022-07-06 | Stop reason: SDUPTHER

## 2022-07-05 NOTE — TELEPHONE ENCOUNTER
Last Appointment:  5/9/2022  Future Appointments   Date Time Provider Weston Moran   7/19/2022  1:00 PM DO NOVA Mosquera [facility]   8/9/2022  2:00 PM MD Maria G Rose [facility]

## 2022-07-06 RX ORDER — POTASSIUM CHLORIDE 20 MEQ/1
TABLET, EXTENDED RELEASE ORAL
Qty: 45 TABLET | Refills: 0 | Status: SHIPPED
Start: 2022-07-06 | End: 2022-09-23 | Stop reason: SDUPTHER

## 2022-07-06 RX ORDER — HYDROCHLOROTHIAZIDE 25 MG/1
25 TABLET ORAL DAILY
Qty: 30 TABLET | Refills: 0 | Status: SHIPPED
Start: 2022-07-06 | End: 2022-09-23 | Stop reason: SDUPTHER

## 2022-07-15 NOTE — PROGRESS NOTES
[facility] CARDIOLOGY  CARDIAC ELECTROPHYSIOLOGY DEPARTMENT/DIVISION OF CARDIOLOGY  Outpatient Progress Report  PATIENT: Gemma Jolley  MEDICAL RECORD NUMBER: [id number]  DATE OF SERVICE:  7/19/2022  ATTENDING ELECTROPHYSIOLOGIST:  Michelle Archuleta D.O.  REFERRING PHYSICIAN: No ref. provider found and Armando Hernández MD  CHIEF COMPLAINT: AF    Subjective: Gemma Jolley is a 63 y.o. male with a history of nonvalvular longstanding persistent atrial fibrillation status post DCCV (3/2022-failed), HTN, pericarditis (2018), and OA. He is managed by Dr. Sunshine Garza with HCTZ 25 mg daily, Xarelto 20 mg daily, and verapamil 240 mg daily. In 3/2021, he was diagnosed with atrial fibrillation. He was referred to Dr. Masha Chavez who reviewed antiarrhythmic and ablation therapies with patient, but patient did not pursue and did not follow-up. In 2/2022, a event monitor reported 100% AF burden with episodes of rapid ventricular rate. In 3/2022, he had DCCV with Dr. Sunshine Garza which failed to restore sinus rhythm. In 3/2022, patient transferred EP care to my office. I recommended an antiarrhythmic initiation followed by repeat DCCV attempt. However patient decided not to pursue. He presents today, 7/19/22; follow-up with my office. He denies any complaints at this time. Prior cardiac testing:   ECG: 3/17/2022: AF with ventricular rate of 66 bpm.  ALBERTO: 3/1/2022: LVEF 60-65%, mildly dilated aortic root, bi atrial dilation. TTE: 5/11/2021: LVEF 55-65%, normal left atrium  Exercise Stress Study (non-nuclear): 6/15/2020: patient exercised using a Kal protocol, completing 6:10 minutes and reaching an estimated work load of 7.0 metabolic equivalents. Resting HR was 72. Peak exercise heart rate was 136 ( 84% of maximum predicted heart rate for age). Baseline /78.  Peak exercise BP     Past Medical History:   Diagnosis Date    Abdominal fibromatosis 2017    Atrial fibrillation (HCC)     Hyperlipidemia Hypertension     Osteoarthritis     knees    Sinusitis      Past Surgical History:   Procedure Laterality Date    CARDIOVERSION  03/01/2022    COLONOSCOPY  04/11/2012    KNEE ARTHROSCOPY Bilateral 2009, 2010    left knee, right knee    TRANSESOPHAGEAL ECHOCARDIOGRAM  03/01/2022      Family History   Problem Relation Age of Onset    High Blood Pressure Mother     High Blood Pressure Father     No Known Problems Sister     No Known Problems Brother     No Known Problems Brother     No Known Problems Brother      There is no family history of sudden cardiac arrest    Social History     Tobacco Use    Smoking status: Never    Smokeless tobacco: Never   Substance Use Topics    Alcohol use: Yes     Comment: rare       Current Outpatient Medications   Medication Sig Dispense Refill    rivaroxaban (XARELTO) 20 MG TABS tablet Take 1 tablet by mouth daily (with breakfast) 30 tablet 3    hydroCHLOROthiazide (HYDRODIURIL) 25 MG tablet Take 1 tablet by mouth daily 30 tablet 0    potassium chloride (KLOR-CON M) 20 MEQ extended release tablet TAKE 1 AND 1/2 TABLETS BY MOUTH DAILY 45 tablet 0    verapamil (CALAN SR) 240 MG extended release tablet Take 1 tablet by mouth daily 30 tablet 3    EPINEPHrine (EPIPEN 2-GUERITA) 0.3 MG/0.3ML SOAJ injection Inject 0.3 mLs into the muscle once as needed (severe allergic reaction) Use as directed for allergic reaction 1 each 0     No current facility-administered medications for this visit. Allergies   Allergen Reactions    Apple Swelling     tongue swelling       ROS:   Constitutional: Negative for fever, activity change and appetite change. HENT: Negative for epistaxis. Eyes: Negative for diploplia, blurred vision. Respiratory: Negative for cough, chest tightness, shortness of breath and wheezing. Cardiovascular: pertinent positives in HPI  Gastrointestinal: Negative for abdominal pain and blood in stool. Genitourinary: Negative for hematuria and difficulty urinating. patient desires to consider prior to pursuing. I ordered labs today. He did have a cardiac CTA performed recently, which reported 4 PVs and no LA/SHEFALI thrombus. If DCCV was able to restore sinus rhythm, then I believe it would be reasonable to pursue AF ablation. However, if DCCV failed despite amiodarone, then would abandon further attempts at rhythm control and not pursue ablation as he appears with LVEF of 60 to 65% noted on ALBERTO in 3/2022.    -Modifiable risk factors:  --obesity: BMI goal less than 27  --ROXANN: Patient denies any sleep apnea symptoms.  --HTN: BP goal less than 130/80. --Alcohol: Continue to avoid alcohol  -Patient follow-up with my office as needed at this time. If he desires to pursue amiodarone with reduction in verapamil followed by DCCV, then we can schedule follow-up with my office. However, if he desires not to then he can continue to follow with Dr. Cleo Mcbride for permanent A. fib as rhythm control would not be pursued. .    I spent a total of 40 minutes reviewing previous notes, test results, and face to face with the patient discussing the diagnosis and importance of compliance with the treatment plan as well as documenting on the day of the visit. Time of the day of service includes:  Preparing to see the patient (eg. Review of the medical record, such as tests). Obtaining and/or reviewing separately obtained history. Ordering prescription medications, tests, and/or procedures. Communicating results to the patient/family/caregiver. Counseling/educating the patient/family/caregiver. Documenting clinical information in the patients electronic record. Coordination of care for the patient. Performing a medical appropriate exam and/or evaluation. Thank you for allowing me to participate in your patient's care. Please call me if there are any questions. Meli Palomino D.O.   Cardiac Electrophysiology  [facility]  [facility]) Physicians    CC: Jorge Alberto Yi MD

## 2022-07-19 ENCOUNTER — OFFICE VISIT (OUTPATIENT)
Dept: NON INVASIVE DIAGNOSTICS | Age: 62
End: 2022-07-19
Payer: COMMERCIAL

## 2022-07-19 VITALS
SYSTOLIC BLOOD PRESSURE: 132 MMHG | BODY MASS INDEX: 29.13 KG/M2 | WEIGHT: 185.6 LBS | RESPIRATION RATE: 18 BRPM | HEIGHT: 67 IN | DIASTOLIC BLOOD PRESSURE: 86 MMHG | HEART RATE: 52 BPM

## 2022-07-19 DIAGNOSIS — I48.91 ATRIAL FIBRILLATION, UNSPECIFIED TYPE (HCC): Primary | ICD-10-CM

## 2022-07-19 DIAGNOSIS — I48.91 ATRIAL FIBRILLATION, UNSPECIFIED TYPE (HCC): ICD-10-CM

## 2022-07-19 DIAGNOSIS — I10 ESSENTIAL HYPERTENSION: ICD-10-CM

## 2022-07-19 LAB
ALBUMIN SERPL-MCNC: 4.7 G/DL (ref 3.5–5.2)
ALP BLD-CCNC: 65 U/L (ref 40–129)
ALT SERPL-CCNC: 20 U/L (ref 0–40)
ANION GAP SERPL CALCULATED.3IONS-SCNC: 11 MMOL/L (ref 7–16)
AST SERPL-CCNC: 24 U/L (ref 0–39)
BILIRUB SERPL-MCNC: 0.6 MG/DL (ref 0–1.2)
BUN BLDV-MCNC: 18 MG/DL (ref 6–23)
CALCIUM SERPL-MCNC: 9.5 MG/DL (ref 8.6–10.2)
CHLORIDE BLD-SCNC: 99 MMOL/L (ref 98–107)
CHOLESTEROL, TOTAL: 199 MG/DL (ref 0–199)
CO2: 27 MMOL/L (ref 22–29)
CREAT SERPL-MCNC: 1.3 MG/DL (ref 0.7–1.2)
GFR AFRICAN AMERICAN: >60
GFR NON-AFRICAN AMERICAN: >60 ML/MIN/1.73
GLUCOSE BLD-MCNC: 88 MG/DL (ref 74–99)
HDLC SERPL-MCNC: 49 MG/DL
LDL CHOLESTEROL CALCULATED: 144 MG/DL (ref 0–99)
POTASSIUM SERPL-SCNC: 4.5 MMOL/L (ref 3.5–5)
SODIUM BLD-SCNC: 137 MMOL/L (ref 132–146)
TOTAL PROTEIN: 7.6 G/DL (ref 6.4–8.3)
TRIGL SERPL-MCNC: 32 MG/DL (ref 0–149)
TSH SERPL DL<=0.05 MIU/L-ACNC: 3.2 UIU/ML (ref 0.27–4.2)
VLDLC SERPL CALC-MCNC: 6 MG/DL

## 2022-07-19 PROCEDURE — 99214 OFFICE O/P EST MOD 30 MIN: CPT | Performed by: STUDENT IN AN ORGANIZED HEALTH CARE EDUCATION/TRAINING PROGRAM

## 2022-07-19 PROCEDURE — 36415 COLL VENOUS BLD VENIPUNCTURE: CPT | Performed by: STUDENT IN AN ORGANIZED HEALTH CARE EDUCATION/TRAINING PROGRAM

## 2022-07-19 PROCEDURE — 93000 ELECTROCARDIOGRAM COMPLETE: CPT | Performed by: STUDENT IN AN ORGANIZED HEALTH CARE EDUCATION/TRAINING PROGRAM

## 2022-07-19 RX ORDER — AMIODARONE HYDROCHLORIDE 200 MG/1
TABLET ORAL
Qty: 90 TABLET | Refills: 1 | Status: SHIPPED
Start: 2022-07-19 | End: 2022-07-19

## 2022-07-19 NOTE — PATIENT INSTRUCTIONS
No medication changes at this time. Please contact Dr Melanie Kennedy office if you desire to pursue short-term amiodarone and cardioversion. Labs drawn today. Follow-up with Dr Melanie munoz as needed at this time. If you desire to pursue amiodarone and cardioversion, an appointment will be scheduled with Dr Melanie munoz.

## 2022-07-19 NOTE — PROGRESS NOTES
Pt seen in office today for blood work for TSH, CMP, and Lipid panel per Dr. Melanie Kennedy. Pt tolerated blood draw from Right arm.      Electronically signed by Gerardo Navarro MA on 7/19/2022 at 3:16 PM

## 2022-08-18 ENCOUNTER — OFFICE VISIT (OUTPATIENT)
Dept: FAMILY MEDICINE CLINIC | Age: 62
End: 2022-08-18
Payer: COMMERCIAL

## 2022-08-18 VITALS
HEIGHT: 67 IN | OXYGEN SATURATION: 98 % | HEART RATE: 71 BPM | DIASTOLIC BLOOD PRESSURE: 88 MMHG | SYSTOLIC BLOOD PRESSURE: 135 MMHG | WEIGHT: 182.56 LBS | BODY MASS INDEX: 28.65 KG/M2 | TEMPERATURE: 97.5 F

## 2022-08-18 DIAGNOSIS — Z12.11 SCREENING FOR COLON CANCER: ICD-10-CM

## 2022-08-18 DIAGNOSIS — I10 ESSENTIAL HYPERTENSION: ICD-10-CM

## 2022-08-18 DIAGNOSIS — M17.0 PRIMARY OSTEOARTHRITIS OF BOTH KNEES: ICD-10-CM

## 2022-08-18 DIAGNOSIS — I48.91 ATRIAL FIBRILLATION, UNSPECIFIED TYPE (HCC): Primary | ICD-10-CM

## 2022-08-18 PROCEDURE — 99213 OFFICE O/P EST LOW 20 MIN: CPT | Performed by: STUDENT IN AN ORGANIZED HEALTH CARE EDUCATION/TRAINING PROGRAM

## 2022-08-18 PROCEDURE — 99212 OFFICE O/P EST SF 10 MIN: CPT | Performed by: STUDENT IN AN ORGANIZED HEALTH CARE EDUCATION/TRAINING PROGRAM

## 2022-08-18 RX ORDER — EPINEPHRINE 0.3 MG/.3ML
0.3 INJECTION SUBCUTANEOUS
Qty: 1 EACH | Refills: 0 | Status: SHIPPED
Start: 2022-08-18 | End: 2022-09-23 | Stop reason: SDUPTHER

## 2022-08-18 RX ORDER — VERAPAMIL HYDROCHLORIDE 240 MG/1
240 TABLET, FILM COATED, EXTENDED RELEASE ORAL DAILY
Qty: 30 TABLET | Refills: 3 | Status: SHIPPED | OUTPATIENT
Start: 2022-08-18

## 2022-08-18 ASSESSMENT — PATIENT HEALTH QUESTIONNAIRE - PHQ9
2. FEELING DOWN, DEPRESSED OR HOPELESS: 0
SUM OF ALL RESPONSES TO PHQ9 QUESTIONS 1 & 2: 0
SUM OF ALL RESPONSES TO PHQ QUESTIONS 1-9: 0
1. LITTLE INTEREST OR PLEASURE IN DOING THINGS: 0
SUM OF ALL RESPONSES TO PHQ QUESTIONS 1-9: 0

## 2022-08-18 ASSESSMENT — LIFESTYLE VARIABLES
HOW OFTEN DO YOU HAVE A DRINK CONTAINING ALCOHOL: NEVER
HOW MANY STANDARD DRINKS CONTAINING ALCOHOL DO YOU HAVE ON A TYPICAL DAY: PATIENT DOES NOT DRINK

## 2022-08-18 NOTE — PROGRESS NOTES
[facility] [address] [facility]  Precepting Note    Subjective:  Afib s/p cardioversion, failed. Follows with EP  Declined Amiodarone, wants 2nd opinion from Cardiology  Palpitations worsen with exertion, currently dyspnea or CP  On xarelto and verapamil     B/L OA s/p steroid injections   Would like to see Ortho for additional injections    10lb intentional weight loss- lifestyle changes going well     ROS otherwise negative     Past medical, surgical, family and social history were reviewed, non-contributory, and unchanged unless otherwise stated. Objective:    /88   Pulse 71   Temp 97.5 °F (36.4 °C) (Temporal)   Ht 5' 7\" (1.702 m)   Wt 182 lb 9 oz (82.8 kg)   SpO2 98%   BMI 28.59 kg/m²     Exam is as noted by resident with the following changes, additions or corrections:    General:  NAD; alert & oriented x 3   Heart:  irregularly irregular, no murmurs, gallops, or rubs. Lungs:  CTA bilaterally, no wheeze, rales or rhonchi  Abd: bowel sounds present, nontender, nondistended, no masses  Extrem:  No clubbing, cyanosis, or edema    Assessment/Plan:  Afib- 2nd opinion at [address] [place] [address]. Cont current Xarelto, Verapamil. Sees Cardio  Knee OA- Will follow up for injections either with PCP or Ortho  HM- Cologuard, shingles, covid vaccines at pharmacy   Follow up in 4-6 months     Attending Physician Statement  I have reviewed the chart, including any radiology or labs. I have discussed the case, including pertinent history and exam findings with the resident. I agree with the assessment, plan and orders as documented by the resident. Please refer to the resident note for additional information.       Electronically signed by Belen Wilkins MD on 8/18/2022 at 3:36 PM

## 2022-08-18 NOTE — PROGRESS NOTES
[facility]  Department of Family Medicine  Family Medicine Residency Program  DATE OF VISIT : 2022      Patient:  Grecia Cortez  Age: 63 y.o.       : 1960      Chief complaint:   Chief Complaint   Patient presents with    Atrial Fibrillation         History of Present Illness     Grecia Cortez is a 63 y.o. male who presented to the clinic today for  A fib    A fib  - continues to have palpitation with exertion  - see cardiology and EP, was cardioverted but was unsuccessful.   - was recommended to have repeat cardioversion and to begin amiodarone. Patient refused amiodarone due to reading side effects  -Denies any shortness of breath with palpitations  -Continues to take Xarelto and verapamil as prescribed    OA of B/L knee  - received injection by Dr. Emily Mares  - wishes to return for additional injections  -Denies having surgical intervention in the past for bilateral knees  -Denies improvement of bilateral knee pain with OTC NSAIDs    Health maintenance  -Previous colonoscopy performed over 10 years ago. No polyps at that time  -Wishes to have Cologuard. Disinterested in colonoscopy at this time    Medication List:    Current Outpatient Medications   Medication Sig Dispense Refill    rivaroxaban (XARELTO) 20 MG TABS tablet Take 1 tablet by mouth daily (with breakfast) 30 tablet 3    verapamil (CALAN SR) 240 MG extended release tablet Take 1 tablet by mouth daily 30 tablet 3    EPINEPHrine (EPIPEN 2-GUERITA) 0.3 MG/0.3ML SOAJ injection Inject 0.3 mLs into the muscle once as needed (severe allergic reaction) Use as directed for allergic reaction 1 each 0    hydroCHLOROthiazide (HYDRODIURIL) 25 MG tablet Take 1 tablet by mouth daily 30 tablet 0    potassium chloride (KLOR-CON M) 20 MEQ extended release tablet TAKE 1 AND 1/2 TABLETS BY MOUTH DAILY 45 tablet 0     No current facility-administered medications for this visit.             ROS   Reviewed as above, otherwise negative Physical Exam   Vitals:   Vitals:    08/18/22 1456   BP: 135/88   Pulse: 71   Temp:    SpO2:        Physical Exam  Vitals reviewed. Constitutional:       Appearance: Normal appearance. HENT:      Head: Normocephalic and atraumatic. Cardiovascular:      Rate and Rhythm: Rhythm irregularly irregular. Pulses: Normal pulses. Heart sounds: Normal heart sounds, S1 normal and S2 normal. No murmur heard. No friction rub. No gallop. Pulmonary:      Effort: Pulmonary effort is normal.      Breath sounds: Normal breath sounds. Abdominal:      General: Bowel sounds are normal.      Palpations: Abdomen is soft. Neurological:      Mental Status: He is alert. Psychiatric:         Mood and Affect: Mood normal.         Behavior: Behavior normal.         Assessment and Plan     1. Atrial fibrillation, unspecified type ([address]  -Continue present management  -Will refer to cardiology in [facility] clinic for second opinion per patient's request.  Patient is advised to continue taking Xarelto and verapamil as prescribed  - rivaroxaban (XARELTO) 20 MG TABS tablet; Take 1 tablet by mouth daily (with breakfast)  Dispense: 30 tablet; Refill: 3  - External Referral To Cardiology    2. Essential hypertension  -Well-controlled. Continue present management  - verapamil (CALAN SR) 240 MG extended release tablet; Take 1 tablet by mouth daily  Dispense: 30 tablet; Refill: 3    3. Screening for colon cancer  - Fecal DNA Colorectal cancer screening (Cologuard)    4.  Primary osteoarthritis of both knees  -Patient was on Bilateral knee injection performed in clinic  -Reports previously having knee injections performed at orthopedic office and having success for them pain alleviation  -Denies improvement of bilateral knee pain with over-the-counter NSAIDs        RTO 1 month for BL knee injection  Case discussed with Dr. Marlene Dickens MD

## 2022-09-12 LAB — NONINV COLON CA DNA+OCC BLD SCRN STL QL: NEGATIVE

## 2022-09-23 ENCOUNTER — PROCEDURE VISIT (OUTPATIENT)
Dept: FAMILY MEDICINE CLINIC | Age: 62
End: 2022-09-23
Payer: COMMERCIAL

## 2022-09-23 VITALS
TEMPERATURE: 99 F | DIASTOLIC BLOOD PRESSURE: 89 MMHG | WEIGHT: 183 LBS | OXYGEN SATURATION: 99 % | RESPIRATION RATE: 16 BRPM | HEART RATE: 60 BPM | BODY MASS INDEX: 28.72 KG/M2 | HEIGHT: 67 IN | SYSTOLIC BLOOD PRESSURE: 131 MMHG

## 2022-09-23 DIAGNOSIS — M17.12 PRIMARY OSTEOARTHRITIS OF LEFT KNEE: Primary | ICD-10-CM

## 2022-09-23 DIAGNOSIS — I10 ESSENTIAL HYPERTENSION: ICD-10-CM

## 2022-09-23 DIAGNOSIS — M17.11 PRIMARY OSTEOARTHRITIS OF RIGHT KNEE: ICD-10-CM

## 2022-09-23 DIAGNOSIS — E87.6 HYPOKALEMIA: ICD-10-CM

## 2022-09-23 PROCEDURE — 99213 OFFICE O/P EST LOW 20 MIN: CPT | Performed by: STUDENT IN AN ORGANIZED HEALTH CARE EDUCATION/TRAINING PROGRAM

## 2022-09-23 PROCEDURE — 20610 DRAIN/INJ JOINT/BURSA W/O US: CPT | Performed by: STUDENT IN AN ORGANIZED HEALTH CARE EDUCATION/TRAINING PROGRAM

## 2022-09-23 RX ORDER — LIDOCAINE HYDROCHLORIDE 10 MG/ML
4 INJECTION, SOLUTION INFILTRATION; PERINEURAL ONCE
Status: COMPLETED | OUTPATIENT
Start: 2022-09-23 | End: 2022-09-23

## 2022-09-23 RX ORDER — POTASSIUM CHLORIDE 20 MEQ/1
TABLET, EXTENDED RELEASE ORAL
Qty: 45 TABLET | Refills: 0 | Status: SHIPPED | OUTPATIENT
Start: 2022-09-23

## 2022-09-23 RX ORDER — TRIAMCINOLONE ACETONIDE 40 MG/ML
40 INJECTION, SUSPENSION INTRA-ARTICULAR; INTRAMUSCULAR ONCE
Status: COMPLETED | OUTPATIENT
Start: 2022-09-23 | End: 2022-09-23

## 2022-09-23 RX ORDER — EPINEPHRINE 0.3 MG/.3ML
0.3 INJECTION SUBCUTANEOUS
Qty: 1 EACH | Refills: 0 | Status: SHIPPED | OUTPATIENT
Start: 2022-09-23 | End: 2022-09-23

## 2022-09-23 RX ORDER — LIDOCAINE HYDROCHLORIDE 10 MG/ML
4 INJECTION, SOLUTION INFILTRATION; PERINEURAL ONCE
Status: CANCELLED | OUTPATIENT
Start: 2022-09-23 | End: 2022-09-23

## 2022-09-23 RX ORDER — HYDROCHLOROTHIAZIDE 25 MG/1
25 TABLET ORAL DAILY
Qty: 30 TABLET | Refills: 0 | Status: SHIPPED | OUTPATIENT
Start: 2022-09-23

## 2022-09-23 RX ADMIN — LIDOCAINE HYDROCHLORIDE 4 ML: 10 INJECTION, SOLUTION INFILTRATION; PERINEURAL at 15:36

## 2022-09-23 RX ADMIN — TRIAMCINOLONE ACETONIDE 40 MG: 40 INJECTION, SUSPENSION INTRA-ARTICULAR; INTRAMUSCULAR at 15:36

## 2022-09-23 RX ADMIN — LIDOCAINE HYDROCHLORIDE 4 ML: 10 INJECTION, SOLUTION INFILTRATION; PERINEURAL at 15:34

## 2022-09-23 RX ADMIN — TRIAMCINOLONE ACETONIDE 40 MG: 40 INJECTION, SUSPENSION INTRA-ARTICULAR; INTRAMUSCULAR at 15:35

## 2022-09-23 SDOH — ECONOMIC STABILITY: TRANSPORTATION INSECURITY
IN THE PAST 12 MONTHS, HAS LACK OF TRANSPORTATION KEPT YOU FROM MEETINGS, WORK, OR FROM GETTING THINGS NEEDED FOR DAILY LIVING?: NO

## 2022-09-23 SDOH — ECONOMIC STABILITY: FOOD INSECURITY: WITHIN THE PAST 12 MONTHS, THE FOOD YOU BOUGHT JUST DIDN'T LAST AND YOU DIDN'T HAVE MONEY TO GET MORE.: NEVER TRUE

## 2022-09-23 SDOH — ECONOMIC STABILITY: TRANSPORTATION INSECURITY
IN THE PAST 12 MONTHS, HAS THE LACK OF TRANSPORTATION KEPT YOU FROM MEDICAL APPOINTMENTS OR FROM GETTING MEDICATIONS?: NO

## 2022-09-23 SDOH — ECONOMIC STABILITY: FOOD INSECURITY: WITHIN THE PAST 12 MONTHS, YOU WORRIED THAT YOUR FOOD WOULD RUN OUT BEFORE YOU GOT MONEY TO BUY MORE.: NEVER TRUE

## 2022-09-23 ASSESSMENT — SOCIAL DETERMINANTS OF HEALTH (SDOH): HOW HARD IS IT FOR YOU TO PAY FOR THE VERY BASICS LIKE FOOD, HOUSING, MEDICAL CARE, AND HEATING?: NOT HARD AT ALL

## 2022-09-23 NOTE — PROGRESS NOTES
Attended procedure with Dr Gregory Wu    Patient was placed in seated in the examination room. Written consent was obtained. The patient's Left and right was examined, landmarks were palpated and discovered. The appropriate landmarks, and  soft tissue space was located and marked. A preparation of 4 cc of lidocaine, 1 cc of Kenalog 40 mg was prepared. The procedure site was located and the skin was prepped with Betadine and Alcohol     Using a lateral approach the medication preparation was injected without difficulty. The needle was aspirated and found to be without any signs of hemorrhage or bleeding. The procedure was completed and the patient tolerated the procedure well without any complications. Advised to avoid contamination of area for 1-2 days     Tolerated well. Attending Physician Statement  I have discussed the case, including pertinent history and exam findings with the resident. I also have seen the patient and performed key portions of the examination. I agree with the documented assessment and plan.

## 2022-09-23 NOTE — PROGRESS NOTES
PROCEDURE NOTE:    DIAGNOSIS      bilateral knee pain/ Bilateral OA. PROCEDURE     Bilateral knee joint aspiration/corticosteroid injection. PROCEDURAL PAUSE     Procedural pause conducted to verify: ?correct patient identity, procedure to be performed, and as applicable, correct side and site,  and correct patient position. Discussed risk versus benefits with patient of knee joint injections including the risk of infection, bleeding, etc.  Also discussed conservative treatment measures such as physical therapy, OTC pain medications, physical therapy. At this time patient is agreeable to a joint injection. PROCEDURE DETAILS     The procedure was carried out under sterile technique. Patient was placed in seated in the examination room. Written consent was obtained. The patient's Left and right was examined, landmarks were palpated and discovered. The appropriate landmarks, and  soft tissue space was located and marked. A preparation of 4 cc of lidocaine, 1 cc of Kenalog 40 mg was prepared for each syringe. The procedure site was located and the skin was prepped with Betadine and Alcohol    Using a lateral approach the medication preparation was injected without difficulty. The needle was aspirated and found to be without any signs of hemorrhage or bleeding. The procedure was completed and the patient tolerated the procedure well without any complications. Any blood loss was immediately controlled, bandage was placed over the procedure site and patient was transferred out of the office in stable condition. Postprocedure Care: The patient will avoid heavy exertion with the knee and avoid soaking the knee under water for two days. The patient will contact me with any problems related to the injection.         FOLLOW UP     Follow up in 2 weeks for A fib    Electronically signed by Anastacio Sifuentes MD on 9/23/2022 at 2:16 PM

## 2022-11-11 ENCOUNTER — OFFICE VISIT (OUTPATIENT)
Dept: FAMILY MEDICINE CLINIC | Age: 62
End: 2022-11-11
Payer: COMMERCIAL

## 2022-11-11 VITALS
TEMPERATURE: 97.3 F | RESPIRATION RATE: 18 BRPM | SYSTOLIC BLOOD PRESSURE: 130 MMHG | HEART RATE: 60 BPM | OXYGEN SATURATION: 99 % | DIASTOLIC BLOOD PRESSURE: 82 MMHG | HEIGHT: 67 IN | BODY MASS INDEX: 26.84 KG/M2 | WEIGHT: 171 LBS

## 2022-11-11 DIAGNOSIS — I48.91 ATRIAL FIBRILLATION, UNSPECIFIED TYPE (HCC): ICD-10-CM

## 2022-11-11 DIAGNOSIS — E87.6 HYPOKALEMIA: ICD-10-CM

## 2022-11-11 DIAGNOSIS — I10 ESSENTIAL HYPERTENSION: ICD-10-CM

## 2022-11-11 LAB
ANION GAP SERPL CALCULATED.3IONS-SCNC: 13 MMOL/L (ref 7–16)
BUN BLDV-MCNC: 12 MG/DL (ref 6–23)
CALCIUM SERPL-MCNC: 9.7 MG/DL (ref 8.6–10.2)
CHLORIDE BLD-SCNC: 97 MMOL/L (ref 98–107)
CO2: 25 MMOL/L (ref 22–29)
CREAT SERPL-MCNC: 1.1 MG/DL (ref 0.7–1.2)
GFR SERPL CREATININE-BSD FRML MDRD: >60 ML/MIN/1.73
GLUCOSE BLD-MCNC: 78 MG/DL (ref 74–99)
POTASSIUM SERPL-SCNC: 4.2 MMOL/L (ref 3.5–5)
SODIUM BLD-SCNC: 135 MMOL/L (ref 132–146)

## 2022-11-11 PROCEDURE — 3074F SYST BP LT 130 MM HG: CPT | Performed by: STUDENT IN AN ORGANIZED HEALTH CARE EDUCATION/TRAINING PROGRAM

## 2022-11-11 PROCEDURE — 36415 COLL VENOUS BLD VENIPUNCTURE: CPT | Performed by: FAMILY MEDICINE

## 2022-11-11 PROCEDURE — 99213 OFFICE O/P EST LOW 20 MIN: CPT | Performed by: STUDENT IN AN ORGANIZED HEALTH CARE EDUCATION/TRAINING PROGRAM

## 2022-11-11 PROCEDURE — 3078F DIAST BP <80 MM HG: CPT | Performed by: STUDENT IN AN ORGANIZED HEALTH CARE EDUCATION/TRAINING PROGRAM

## 2022-11-11 RX ORDER — POTASSIUM CHLORIDE 20 MEQ/1
TABLET, EXTENDED RELEASE ORAL
Qty: 45 TABLET | Refills: 0 | Status: SHIPPED | OUTPATIENT
Start: 2022-11-11

## 2022-11-11 RX ORDER — VERAPAMIL HYDROCHLORIDE 240 MG/1
240 TABLET, FILM COATED, EXTENDED RELEASE ORAL DAILY
Qty: 30 TABLET | Refills: 3 | Status: SHIPPED | OUTPATIENT
Start: 2022-11-11

## 2022-11-11 RX ORDER — HYDROCHLOROTHIAZIDE 25 MG/1
25 TABLET ORAL DAILY
Qty: 30 TABLET | Refills: 0 | Status: SHIPPED | OUTPATIENT
Start: 2022-11-11

## 2022-11-11 NOTE — PROGRESS NOTES
[facility]  Department of Family Medicine  Family Medicine Residency Program  DATE OF VISIT : 2022      Patient:  Simon Dean  Age: 63 y.o.       : 1960      Chief complaint:   Chief Complaint   Patient presents with    Atrial Fibrillation     F/u    Other     Test results          History of Present Illness     Simon Dean is a 63 y.o. male who presented to the clinic today for A fib    A fib   - continues to see Cardio and EP ()  - continues to take verapamil and xarelto  - tolerating medication well, denies any side affect to medication  - denies any hear palpitations, chest pain or SOB    Lab review   - Cologaurd negative. - slightly elevated creatine on previous BMP. Denies any chronic NSAID use. Medication List:    Current Outpatient Medications   Medication Sig Dispense Refill    hydroCHLOROthiazide (HYDRODIURIL) 25 MG tablet Take 1 tablet by mouth daily 30 tablet 0    potassium chloride (KLOR-CON M) 20 MEQ extended release tablet TAKE 1 AND 1/2 TABLETS BY MOUTH DAILY 45 tablet 0    rivaroxaban (XARELTO) 20 MG TABS tablet Take 1 tablet by mouth daily (with breakfast) 30 tablet 3    verapamil (CALAN SR) 240 MG extended release tablet Take 1 tablet by mouth daily 30 tablet 3    EPINEPHrine (EPIPEN 2-GUERITA) 0.3 MG/0.3ML SOAJ injection Inject 0.3 mLs into the muscle once as needed (severe allergic reaction) Use as directed for allergic reaction 1 each 0     No current facility-administered medications for this visit. ROS   Reviewed as above, otherwise negative     Physical Exam   Vitals:   Vitals:    22 1538   BP: 130/82   Pulse: 60   Resp: 18   Temp: 97.3 °F (36.3 °C)   SpO2: 99%       Physical Exam  Vitals reviewed. Constitutional:       Appearance: Normal appearance. HENT:      Head: Normocephalic and atraumatic. Cardiovascular:      Rate and Rhythm: Rhythm irregularly irregular. Pulses: Normal pulses.       Heart sounds: Normal heart sounds. Pulmonary:      Effort: Pulmonary effort is normal.      Breath sounds: Normal breath sounds. Abdominal:      General: Bowel sounds are normal.      Palpations: Abdomen is soft. Neurological:      Mental Status: He is alert. Psychiatric:         Mood and Affect: Mood normal.         Behavior: Behavior normal.         Assessment and Plan     1. Atrial fibrillation, unspecified type ([address]  - well controlled. Continue present management  - advised to follow up with cardiology and EP as scheduled. - rivaroxaban (XARELTO) 20 MG TABS tablet; Take 1 tablet by mouth daily (with breakfast)  Dispense: 30 tablet; Refill: 3    2. Essential hypertension  - well controlled at this time. Continue present management. - hydroCHLOROthiazide (HYDRODIURIL) 25 MG tablet; Take 1 tablet by mouth daily  Dispense: 30 tablet; Refill: 0  - verapamil (CALAN SR) 240 MG extended release tablet; Take 1 tablet by mouth daily  Dispense: 30 tablet; Refill: 3    3. Hypokalemia  - hx of hypokalemia and previous elevated Cr level. Will recheck at this time  - potassium chloride (KLOR-CON M) 20 MEQ extended release tablet; TAKE 1 AND 1/2 TABLETS BY MOUTH DAILY  Dispense: 45 tablet; Refill: 0  - Basic Metabolic Panel;  Future        RTO 2 months for A fib follow up  Case discussed with Dr. Jennifer Quick MD

## 2022-11-11 NOTE — PROGRESS NOTES
Attending Physician Statement    S:   Chief Complaint   Patient presents with    Atrial Fibrillation     F/u    Other     Test results       F/u atrial fibrillation - follows with EPS and has no recent symptoms   Recent COLOguard negative   Creatinine slightly elevated on most recent labs  O: Blood pressure 130/82, pulse 60, temperature 97.3 °F (36.3 °C), temperature source Temporal, resp. rate 18, height 5' 7\" (1.702 m), weight 171 lb (77.6 kg), SpO2 99 %. Exam:   Heart - irreg   Lungs - clear   Labs reviewed, creat 1.3  A: Atrial fib, abnormal labs  P:  Continue meds   Repeat BMP   Repeat Cologuard in 3 years   Follow-up as ordered    I have discussed the case, including pertinent history and exam findings with the resident. I agree with the documented assessment and plan.     Werner Hector MD

## 2022-12-27 DIAGNOSIS — I10 ESSENTIAL HYPERTENSION: ICD-10-CM

## 2022-12-27 DIAGNOSIS — E87.6 HYPOKALEMIA: ICD-10-CM

## 2022-12-27 RX ORDER — HYDROCHLOROTHIAZIDE 25 MG/1
25 TABLET ORAL DAILY
Qty: 30 TABLET | Refills: 3 | Status: SHIPPED | OUTPATIENT
Start: 2022-12-27

## 2022-12-27 RX ORDER — POTASSIUM CHLORIDE 20 MEQ/1
TABLET, EXTENDED RELEASE ORAL
Qty: 45 TABLET | Refills: 3 | Status: SHIPPED | OUTPATIENT
Start: 2022-12-27

## 2023-02-02 DIAGNOSIS — I48.91 ATRIAL FIBRILLATION, UNSPECIFIED TYPE (HCC): ICD-10-CM

## 2023-02-02 NOTE — PROGRESS NOTES
Patient requesting medication refill printed to be able to fill all his medication at the [facility].     Will refill xarelto for A fib

## 2023-04-07 NOTE — PROGRESS NOTES
[facility]  Anne Book. RAYO R[facility]. Linda Almaraz M.D. [facility], Nelida Herndon M.D. Sonia Castle M.D. [facility]   1960  Mirian Hernandez MD      This 44-year-old man is seen for outpatient cardiac follow-up today. He has a history of hypertension. He was hospitalized with chest pain in June 2018 and was found to have a circumferential pericardial effusion. This resolved. He had recurrent symptoms in February 2019. Repeat echo showed no effusion. Recently, he had an episode of right lateral chest discomfort which was sharp and well localized as well as some sharp right shoulder top pain. The episodes were not clearly related to any physical or emotional stress. C-reactive protein and sed rate were not significantly elevated. He has completed a course of steroids and now feels better. Medical History:  1. Hypertension. 2. Stress myocardial perfusion, 12/ 2016> normal perfusion and ejection fraction. 3. TTE 10/ 2017 was essentially normal except for mild concentric LVH. 4. Treadmill  EKG 10/26/2017 normal.  5. No history MI,CVA or CHF. 6. Presented to [facility] on 06/26/2018 with mid-sternal \"throbbing/pulsating pain\" that started  6/24/2018 .  7. ECHO: 06/27/2018: (Dr. Aj Fuchs): Normal left ventricular systolic function, EF 56%, Mild LVH, Mild TR, PASP is estimated at 16 mmHg, Small-moderate sized circumferential pericardial effusion (right ventricular inversion in early diastole). No definitive tamponade physiology based on TV/MV . 8. Labs, 06/26/2018. CRP 14.5. Troponin normal, d-dimer 765.  9. Echo, 02/04/2019. Normal EF. Normal diastolic function. No significant valvular abnormality.  No chamber dilatation.       Review of Systems:  Constitutional: negative for fever and chills  Respiratory: negative for cough and hemoptysis  Cardiovascular:   Gastrointestinal: negative for abdominal pain, diarrhea, What Type Of Note Output Would You Prefer (Optional)?: Standard Output How Severe Is Your Rash?: mild Is This A New Presentation, Or A Follow-Up?: Rash

## 2025-07-08 ENCOUNTER — HOSPITAL ENCOUNTER (OUTPATIENT)
Age: 65
Setting detail: OBSERVATION
Discharge: HOME | End: 2025-07-09
Attending: EMERGENCY MEDICINE | Admitting: STUDENT IN AN ORGANIZED HEALTH CARE EDUCATION/TRAINING PROGRAM
Payer: OTHER GOVERNMENT

## 2025-07-08 ENCOUNTER — APPOINTMENT (OUTPATIENT)
Dept: CT IMAGING | Age: 65
End: 2025-07-08
Payer: OTHER GOVERNMENT

## 2025-07-08 DIAGNOSIS — E87.1 HYPONATREMIA: Primary | ICD-10-CM

## 2025-07-08 DIAGNOSIS — R42 DIZZINESS: ICD-10-CM

## 2025-07-08 LAB
ANION GAP SERPL CALCULATED.3IONS-SCNC: 12 MMOL/L (ref 7–16)
ATYPICAL LYMPHOCYTE ABSOLUTE COUNT: 0.05 K/UL (ref 0–0.46)
ATYPICAL LYMPHOCYTES: 1 % (ref 0–4)
BASOPHILS # BLD: 0.05 K/UL (ref 0–0.2)
BASOPHILS NFR BLD: 1 % (ref 0–2)
BUN SERPL-MCNC: 9 MG/DL (ref 8–23)
CALCIUM SERPL-MCNC: 8.7 MG/DL (ref 8.8–10.2)
CHLORIDE SERPL-SCNC: 93 MMOL/L (ref 98–107)
CO2 SERPL-SCNC: 24 MMOL/L (ref 22–29)
CREAT SERPL-MCNC: 1.1 MG/DL (ref 0.7–1.2)
EOSINOPHIL # BLD: 0 K/UL (ref 0.05–0.5)
EOSINOPHILS RELATIVE PERCENT: 0 % (ref 0–6)
ERYTHROCYTE [DISTWIDTH] IN BLOOD BY AUTOMATED COUNT: 12.7 % (ref 11.5–15)
GFR, ESTIMATED: 79 ML/MIN/1.73M2
GLUCOSE SERPL-MCNC: 96 MG/DL (ref 74–99)
HCT VFR BLD AUTO: 44.2 % (ref 37–54)
HGB BLD-MCNC: 15.9 G/DL (ref 12.5–16.5)
LYMPHOCYTES NFR BLD: 1.09 K/UL (ref 1.5–4)
LYMPHOCYTES RELATIVE PERCENT: 19 % (ref 20–42)
MAGNESIUM SERPL-MCNC: 1.9 MG/DL (ref 1.6–2.4)
MCH RBC QN AUTO: 31.6 PG (ref 26–35)
MCHC RBC AUTO-ENTMCNC: 36 G/DL (ref 32–34.5)
MCV RBC AUTO: 87.9 FL (ref 80–99.9)
MONOCYTES NFR BLD: 0.5 K/UL (ref 0.1–0.95)
MONOCYTES NFR BLD: 8 % (ref 2–12)
NEUTROPHILS NFR BLD: 71 % (ref 43–80)
NEUTS SEG NFR BLD: 4.21 K/UL (ref 1.8–7.3)
PLATELET # BLD AUTO: 174 K/UL (ref 130–450)
PMV BLD AUTO: 11.6 FL (ref 7–12)
POTASSIUM SERPL-SCNC: 4.4 MMOL/L (ref 3.5–5.1)
RBC # BLD AUTO: 5.03 M/UL (ref 3.8–5.8)
RBC # BLD: ABNORMAL 10*6/UL
RBC # BLD: ABNORMAL 10*6/UL
SODIUM SERPL-SCNC: 129 MMOL/L (ref 136–145)
WBC OTHER # BLD: 5.9 K/UL (ref 4.5–11.5)

## 2025-07-08 PROCEDURE — 2500000003 HC RX 250 WO HCPCS: Performed by: STUDENT IN AN ORGANIZED HEALTH CARE EDUCATION/TRAINING PROGRAM

## 2025-07-08 PROCEDURE — 83735 ASSAY OF MAGNESIUM: CPT

## 2025-07-08 PROCEDURE — G0378 HOSPITAL OBSERVATION PER HR: HCPCS

## 2025-07-08 PROCEDURE — 99285 EMERGENCY DEPT VISIT HI MDM: CPT

## 2025-07-08 PROCEDURE — 85025 COMPLETE CBC W/AUTO DIFF WBC: CPT

## 2025-07-08 PROCEDURE — 80048 BASIC METABOLIC PNL TOTAL CA: CPT

## 2025-07-08 PROCEDURE — 93005 ELECTROCARDIOGRAM TRACING: CPT | Performed by: EMERGENCY MEDICINE

## 2025-07-08 PROCEDURE — 6370000000 HC RX 637 (ALT 250 FOR IP): Performed by: INTERNAL MEDICINE

## 2025-07-08 PROCEDURE — 96360 HYDRATION IV INFUSION INIT: CPT

## 2025-07-08 PROCEDURE — 2580000003 HC RX 258: Performed by: EMERGENCY MEDICINE

## 2025-07-08 PROCEDURE — 96361 HYDRATE IV INFUSION ADD-ON: CPT

## 2025-07-08 PROCEDURE — 6370000000 HC RX 637 (ALT 250 FOR IP): Performed by: STUDENT IN AN ORGANIZED HEALTH CARE EDUCATION/TRAINING PROGRAM

## 2025-07-08 PROCEDURE — 70450 CT HEAD/BRAIN W/O DYE: CPT

## 2025-07-08 RX ORDER — ACETAMINOPHEN 160 MG
2000 TABLET,DISINTEGRATING ORAL DAILY
COMMUNITY

## 2025-07-08 RX ORDER — ACETAMINOPHEN 650 MG/1
650 SUPPOSITORY RECTAL EVERY 6 HOURS PRN
Status: DISCONTINUED | OUTPATIENT
Start: 2025-07-08 | End: 2025-07-09 | Stop reason: HOSPADM

## 2025-07-08 RX ORDER — ACETAMINOPHEN 325 MG/1
650 TABLET ORAL EVERY 6 HOURS PRN
Status: DISCONTINUED | OUTPATIENT
Start: 2025-07-08 | End: 2025-07-09 | Stop reason: HOSPADM

## 2025-07-08 RX ORDER — 0.9 % SODIUM CHLORIDE 0.9 %
1000 INTRAVENOUS SOLUTION INTRAVENOUS ONCE
Status: COMPLETED | OUTPATIENT
Start: 2025-07-08 | End: 2025-07-08

## 2025-07-08 RX ORDER — SODIUM CHLORIDE 0.9 % (FLUSH) 0.9 %
5-40 SYRINGE (ML) INJECTION EVERY 12 HOURS SCHEDULED
Status: DISCONTINUED | OUTPATIENT
Start: 2025-07-08 | End: 2025-07-09 | Stop reason: HOSPADM

## 2025-07-08 RX ORDER — POTASSIUM CHLORIDE 1500 MG/1
40 TABLET, EXTENDED RELEASE ORAL PRN
Status: DISCONTINUED | OUTPATIENT
Start: 2025-07-08 | End: 2025-07-09 | Stop reason: HOSPADM

## 2025-07-08 RX ORDER — HYDROCHLOROTHIAZIDE 25 MG/1
25 TABLET ORAL DAILY
Status: DISCONTINUED | OUTPATIENT
Start: 2025-07-08 | End: 2025-07-09 | Stop reason: HOSPADM

## 2025-07-08 RX ORDER — ENOXAPARIN SODIUM 100 MG/ML
40 INJECTION SUBCUTANEOUS DAILY
Status: DISCONTINUED | OUTPATIENT
Start: 2025-07-08 | End: 2025-07-09 | Stop reason: HOSPADM

## 2025-07-08 RX ORDER — CHOLECALCIFEROL (VITAMIN D3) 50 MCG
2000 TABLET ORAL DAILY
Status: DISCONTINUED | OUTPATIENT
Start: 2025-07-08 | End: 2025-07-09 | Stop reason: HOSPADM

## 2025-07-08 RX ORDER — ONDANSETRON 2 MG/ML
4 INJECTION INTRAMUSCULAR; INTRAVENOUS EVERY 6 HOURS PRN
Status: DISCONTINUED | OUTPATIENT
Start: 2025-07-08 | End: 2025-07-09 | Stop reason: HOSPADM

## 2025-07-08 RX ORDER — POTASSIUM CHLORIDE 7.45 MG/ML
10 INJECTION INTRAVENOUS PRN
Status: DISCONTINUED | OUTPATIENT
Start: 2025-07-08 | End: 2025-07-09 | Stop reason: HOSPADM

## 2025-07-08 RX ORDER — POLYETHYLENE GLYCOL 3350 17 G/17G
17 POWDER, FOR SOLUTION ORAL DAILY PRN
Status: DISCONTINUED | OUTPATIENT
Start: 2025-07-08 | End: 2025-07-09 | Stop reason: HOSPADM

## 2025-07-08 RX ORDER — SODIUM CHLORIDE 0.9 % (FLUSH) 0.9 %
5-40 SYRINGE (ML) INJECTION PRN
Status: DISCONTINUED | OUTPATIENT
Start: 2025-07-08 | End: 2025-07-09 | Stop reason: HOSPADM

## 2025-07-08 RX ORDER — MAGNESIUM SULFATE IN WATER 40 MG/ML
2000 INJECTION, SOLUTION INTRAVENOUS PRN
Status: DISCONTINUED | OUTPATIENT
Start: 2025-07-08 | End: 2025-07-09 | Stop reason: HOSPADM

## 2025-07-08 RX ORDER — ONDANSETRON 4 MG/1
4 TABLET, ORALLY DISINTEGRATING ORAL EVERY 8 HOURS PRN
Status: DISCONTINUED | OUTPATIENT
Start: 2025-07-08 | End: 2025-07-09 | Stop reason: HOSPADM

## 2025-07-08 RX ORDER — SODIUM CHLORIDE 9 MG/ML
INJECTION, SOLUTION INTRAVENOUS PRN
Status: DISCONTINUED | OUTPATIENT
Start: 2025-07-08 | End: 2025-07-09 | Stop reason: HOSPADM

## 2025-07-08 RX ORDER — VERAPAMIL HYDROCHLORIDE 240 MG/1
240 TABLET, FILM COATED, EXTENDED RELEASE ORAL DAILY
Status: DISCONTINUED | OUTPATIENT
Start: 2025-07-08 | End: 2025-07-09 | Stop reason: HOSPADM

## 2025-07-08 RX ADMIN — SODIUM CHLORIDE 1000 ML: 0.9 INJECTION, SOLUTION INTRAVENOUS at 09:08

## 2025-07-08 RX ADMIN — VERAPAMIL HYDROCHLORIDE 240 MG: 240 TABLET, FILM COATED, EXTENDED RELEASE ORAL at 17:37

## 2025-07-08 RX ADMIN — SODIUM CHLORIDE, PRESERVATIVE FREE 10 ML: 5 INJECTION INTRAVENOUS at 19:56

## 2025-07-08 RX ADMIN — HYDROCHLOROTHIAZIDE 25 MG: 25 TABLET ORAL at 17:37

## 2025-07-08 RX ADMIN — Medication 2000 UNITS: at 19:56

## 2025-07-08 ASSESSMENT — PAIN - FUNCTIONAL ASSESSMENT: PAIN_FUNCTIONAL_ASSESSMENT: NONE - DENIES PAIN

## 2025-07-08 NOTE — ED PROVIDER NOTES
HPI:  7/8/25, Time: 12:16 PM EDT         Jorge Rey is a 65 y.o. male presenting to the ED for dizziness worse with head movement patient feels he is being dehydrated recently he has been drinking excessive amounts of water over the last several days.  He has mild nausea no headache., beginning 2 days ago.  The complaint has been persistent, moderate in severity, and worsened by standing.      Review of Systems:   A complete review of systems was performed and pertinent positives and negatives are stated within HPI, all other systems reviewed and are negative.    --------------------------------------------- PAST HISTORY ---------------------------------------------  Past Medical History:  has a past medical history of Abdominal fibromatosis, Atrial fibrillation (HCC), Hyperlipidemia, Hypertension, Osteoarthritis, and Sinusitis.    Past Surgical History:  has a past surgical history that includes Knee arthroscopy (Bilateral, 2009, 2010); Colonoscopy (04/11/2012); Cardioversion (03/01/2022); and transesophageal echocardiogram (03/01/2022).    Social History:  reports that he has never smoked. He has never used smokeless tobacco. He reports current alcohol use. He reports that he does not currently use drugs.    Family History: family history includes High Blood Pressure in his father and mother; No Known Problems in his brother, brother, brother, and sister.     The patient’s home medications have been reviewed.    Allergies: Patient has no known allergies.    -------------------------------------------------- RESULTS -------------------------------------------------  All laboratory and radiology results have been personally reviewed by myself   LABS:  Results for orders placed or performed during the hospital encounter of 07/08/25   BMP   Result Value Ref Range    Sodium 129 (L) 136 - 145 mmol/L    Potassium 4.4 3.5 - 5.1 mmol/L    Chloride 93 (L) 98 - 107 mmol/L    CO2 24 22 - 29 mmol/L    Anion Gap 12 7 -

## 2025-07-08 NOTE — PROGRESS NOTES
Database initiated pharmacy and medications verified with the patient. He is A&O comes in from home alone. He uses no assistive devices and is RA at baseline.

## 2025-07-08 NOTE — ED NOTES
Orthostatic BP and pulse  Lying : /101 ; Pulse 68  Sitting: /94; pulse 70  Standing: /105; pulse 86

## 2025-07-08 NOTE — ED NOTES
ED to Inpatient Handoff Report    Notified 5s that electronic handoff available and patient ready for transport to room 0545.    Safety Risks: None identified    Patient in Restraints: no    Constant Observer or Patient : no    Telemetry Monitoring Ordered :Yes           Order to transfer to unit without monitor:N/A    Last MEWS: 0 Time completed: 1559    Deterioration Index Score:   Predictive Model Details          24 (Normal)  Factor Value    Calculated 7/8/2025 16:00 46% Age 65 years old    Deterioration Index Model 17% Respiratory rate 14     16% Sodium abnormal (129 mmol/L)     9% Potassium 4.4 mmol/L     6% Systolic 137     3% Pulse 58     3% Pulse oximetry 99 %     0% WBC count 5.9 k/uL     0% Hematocrit 44.2 %     0% Temperature 98.6 °F (37 °C)        Vitals:    07/08/25 0715 07/08/25 1559   BP: (!) 157/97 (!) 137/94   Pulse: 82 58   Resp: 16 14   Temp: 98.5 °F (36.9 °C) 98.6 °F (37 °C)   TempSrc: Oral Oral   SpO2: 100% 99%   Weight: 75.8 kg (167 lb)    Height: 1.702 m (5' 7\")          Opportunity for questions and clarification was provided.

## 2025-07-08 NOTE — PROGRESS NOTES
4 Eyes Skin Assessment     NAME:  Jorge Rey  YOB: 1960  MEDICAL RECORD NUMBER:  [id number]    The patient is being assessed for  Admission    I agree that at least one RN has performed a thorough Head to Toe Skin Assessment on the patient. ALL assessment sites listed below have been assessed.      Areas assessed by both nurses:    Head, Face, Ears, Shoulders, Back, Chest, Arms, Elbows, Hands, Sacrum. Buttock, Coccyx, Ischium, Legs. Feet and Heels, and Under Medical Devices         Does the Patient have a Wound? No noted wound(s)       Jerrell Prevention initiated by RN: No  Wound Care Orders initiated by RN: No    Pressure Injury (Stage 1,2,3,4, Unstageable, DTI, NWPT, and Complex wounds) if present, place Wound referral order by RN under : No    New Ostomies, if present place, Ostomy referral order under : No     Nurse 1 eSignature: Electronically signed by Anai Cornejo RN on 7/8/25 at 5:02 PM EDT    **SHARE this note so that the co-signing nurse can place an eSignature**    Nurse 2 eSignature: Electronically signed by WILBER JARVIS RN on 7/8/25 at 5:03 PM EDT

## 2025-07-08 NOTE — H&P
[facility] History and Physical      CHIEF COMPLAINT:  dizziness, lightheadedness     History of Present Illness:      This is a 65 year old male with past medical history of Afib, Hld, HTN who presents to the ER with complaints of dizziness and lightheadedness. He is hypertensive on arrival. Lab workup: Na 129, Cl 93. States he has been drinking a lot of water due to the increased heat lately. He was given 1 L bolus in Er. CT head negative, decision to admit for workup.     Informant(s) for H&P: patient, epic     REVIEW OF SYSTEMS:  A comprehensive review of systems was negative except for: what is in the HPI    PMH:  Past Medical History:   Diagnosis Date    Abdominal fibromatosis 2017    Atrial fibrillation (HCC)     Hyperlipidemia     Hypertension     Osteoarthritis     knees    Sinusitis      Surgical History:  Past Surgical History:   Procedure Laterality Date    CARDIOVERSION  03/01/2022    COLONOSCOPY  04/11/2012    KNEE ARTHROSCOPY Bilateral 2009, 2010    left knee, right knee    TRANSESOPHAGEAL ECHOCARDIOGRAM  03/01/2022     Medications Prior to Admission:    Prior to Admission medications    Medication Sig Start Date End Date Taking? Authorizing Provider   rivaroxaban (XARELTO) 20 MG TABS tablet Take 1 tablet by mouth daily (with breakfast) 2/2/23   Qamar Cash MD   hydroCHLOROthiazide (HYDRODIURIL) 25 MG tablet Take 1 tablet by mouth daily 12/27/22   Qamar Cash MD   potassium chloride (KLOR-CON M) 20 MEQ extended release tablet TAKE 1 AND 1/2 TABLETS BY MOUTH DAILY 12/27/22   Qamar Cash MD   verapamil (CALAN SR) 240 MG extended release tablet Take 1 tablet by mouth daily 11/11/22   Qamar Cash MD   EPINEPHrine (EPIPEN 2-GUERITA) 0.3 MG/0.3ML SOAJ injection Inject 0.3 mLs into the muscle once as needed (severe allergic reaction) Use as directed for allergic reaction 9/23/22 9/23/22  Qamar Cash MD     Allergies:    Apple juice  Social History:    reports that he

## 2025-07-09 VITALS
WEIGHT: 176.7 LBS | BODY MASS INDEX: 27.73 KG/M2 | TEMPERATURE: 98.6 F | HEART RATE: 72 BPM | RESPIRATION RATE: 18 BRPM | OXYGEN SATURATION: 98 % | DIASTOLIC BLOOD PRESSURE: 85 MMHG | SYSTOLIC BLOOD PRESSURE: 132 MMHG | HEIGHT: 67 IN

## 2025-07-09 LAB
ANION GAP SERPL CALCULATED.3IONS-SCNC: 13 MMOL/L (ref 7–16)
BASOPHILS # BLD: 0.03 K/UL (ref 0–0.2)
BASOPHILS NFR BLD: 1 % (ref 0–2)
BUN SERPL-MCNC: 9 MG/DL (ref 8–23)
CALCIUM SERPL-MCNC: 8.8 MG/DL (ref 8.8–10.2)
CHLORIDE SERPL-SCNC: 100 MMOL/L (ref 98–107)
CO2 SERPL-SCNC: 23 MMOL/L (ref 22–29)
CREAT SERPL-MCNC: 1.1 MG/DL (ref 0.7–1.2)
EOSINOPHIL # BLD: 0.14 K/UL (ref 0.05–0.5)
EOSINOPHILS RELATIVE PERCENT: 3 % (ref 0–6)
ERYTHROCYTE [DISTWIDTH] IN BLOOD BY AUTOMATED COUNT: 12.5 % (ref 11.5–15)
GFR, ESTIMATED: 77 ML/MIN/1.73M2
GLUCOSE SERPL-MCNC: 113 MG/DL (ref 74–99)
HBA1C MFR BLD: 5.1 % (ref 4–5.6)
HCT VFR BLD AUTO: 42.1 % (ref 37–54)
HGB BLD-MCNC: 15.3 G/DL (ref 12.5–16.5)
IMM GRANULOCYTES # BLD AUTO: <0.03 K/UL (ref 0–0.58)
IMM GRANULOCYTES NFR BLD: 0 % (ref 0–5)
LYMPHOCYTES NFR BLD: 1.13 K/UL (ref 1.5–4)
LYMPHOCYTES RELATIVE PERCENT: 22 % (ref 20–42)
MCH RBC QN AUTO: 31.3 PG (ref 26–35)
MCHC RBC AUTO-ENTMCNC: 36.3 G/DL (ref 32–34.5)
MCV RBC AUTO: 86.1 FL (ref 80–99.9)
MONOCYTES NFR BLD: 0.43 K/UL (ref 0.1–0.95)
MONOCYTES NFR BLD: 9 % (ref 2–12)
NEUTROPHILS NFR BLD: 66 % (ref 43–80)
NEUTS SEG NFR BLD: 3.33 K/UL (ref 1.8–7.3)
PLATELET # BLD AUTO: 162 K/UL (ref 130–450)
PMV BLD AUTO: 11.8 FL (ref 7–12)
POTASSIUM SERPL-SCNC: 3.2 MMOL/L (ref 3.5–5.1)
RBC # BLD AUTO: 4.89 M/UL (ref 3.8–5.8)
SODIUM SERPL-SCNC: 137 MMOL/L (ref 136–145)
WBC OTHER # BLD: 5.1 K/UL (ref 4.5–11.5)

## 2025-07-09 PROCEDURE — 36415 COLL VENOUS BLD VENIPUNCTURE: CPT

## 2025-07-09 PROCEDURE — G0378 HOSPITAL OBSERVATION PER HR: HCPCS

## 2025-07-09 PROCEDURE — 6370000000 HC RX 637 (ALT 250 FOR IP): Performed by: STUDENT IN AN ORGANIZED HEALTH CARE EDUCATION/TRAINING PROGRAM

## 2025-07-09 PROCEDURE — 80048 BASIC METABOLIC PNL TOTAL CA: CPT

## 2025-07-09 PROCEDURE — 6370000000 HC RX 637 (ALT 250 FOR IP): Performed by: INTERNAL MEDICINE

## 2025-07-09 PROCEDURE — 2500000003 HC RX 250 WO HCPCS: Performed by: STUDENT IN AN ORGANIZED HEALTH CARE EDUCATION/TRAINING PROGRAM

## 2025-07-09 PROCEDURE — 83036 HEMOGLOBIN GLYCOSYLATED A1C: CPT

## 2025-07-09 PROCEDURE — 85025 COMPLETE CBC W/AUTO DIFF WBC: CPT

## 2025-07-09 RX ADMIN — SODIUM CHLORIDE, PRESERVATIVE FREE 10 ML: 5 INJECTION INTRAVENOUS at 08:37

## 2025-07-09 RX ADMIN — HYDROCHLOROTHIAZIDE 25 MG: 25 TABLET ORAL at 08:37

## 2025-07-09 RX ADMIN — VERAPAMIL HYDROCHLORIDE 240 MG: 240 TABLET, FILM COATED, EXTENDED RELEASE ORAL at 08:37

## 2025-07-09 RX ADMIN — POTASSIUM CHLORIDE 40 MEQ: 1500 TABLET, EXTENDED RELEASE ORAL at 12:05

## 2025-07-09 RX ADMIN — Medication 2000 UNITS: at 08:37

## 2025-07-09 RX ADMIN — RIVAROXABAN 20 MG: 20 TABLET, FILM COATED ORAL at 08:37

## 2025-07-09 NOTE — PROGRESS NOTES
Perfectserved Dr. Barber.  Deidra Rivera, MINISTERIO, on call.  Sent text as patient has had 3 pauses thus far tonight.

## 2025-07-09 NOTE — ACP (ADVANCE CARE PLANNING)
Advance Care Planning   Healthcare Decision Maker:    Primary Decision Maker: Mary Rey - Ex-Spouse - [phone]    Click here to complete Healthcare Decision Makers including selection of the Healthcare Decision Maker Relationship (ie \"Primary\").

## 2025-07-09 NOTE — CARE COORDINATION
Transition of Care-met with patient, introduced myself and CM role in care coordination. OBS for Hyponatremia. He lives alone in a two story home, second floor bedroom/bath set up. He reports independence with ADL's. No history of DME, HHC or SNF. [facility], [facility]. Discharge plan is home, no needs.    Nelda JOSUE, RN  [facility]

## 2025-07-09 NOTE — PLAN OF CARE
Problem: ABCDS Injury Assessment  Goal: Absence of physical injury  7/9/2025 1052 by Mahi Jenkins RN  Outcome: Progressing  7/9/2025 1052 by Mahi Jenkins RN  Outcome: Progressing     Problem: Discharge Planning  Goal: Discharge to home or other facility with appropriate resources  7/9/2025 1052 by Mahi Jenkins RN  Outcome: Progressing  7/9/2025 1052 by Mahi Jenkins RN  Outcome: Progressing     Problem: Safety - Adult  Goal: Free from fall injury  7/9/2025 1052 by Mahi Jenkins RN  Outcome: Progressing  7/9/2025 1052 by Mahi Jenkins RN  Outcome: Progressing  7/8/2025 2244 by Marnie Julian RN  Outcome: Progressing

## 2025-07-09 NOTE — PROGRESS NOTES
NP, Deidra Rivera, advised to watch patient overnight and will see if we need to consult in the morning.

## 2025-07-10 LAB
EKG ATRIAL RATE: 83 BPM
EKG Q-T INTERVAL: 382 MS
EKG QRS DURATION: 92 MS
EKG QTC CALCULATION (BAZETT): 418 MS
EKG R AXIS: 27 DEGREES
EKG T AXIS: 42 DEGREES
EKG VENTRICULAR RATE: 72 BPM

## 2025-07-10 PROCEDURE — 93010 ELECTROCARDIOGRAM REPORT: CPT | Performed by: INTERNAL MEDICINE
